# Patient Record
Sex: MALE | Race: BLACK OR AFRICAN AMERICAN
[De-identification: names, ages, dates, MRNs, and addresses within clinical notes are randomized per-mention and may not be internally consistent; named-entity substitution may affect disease eponyms.]

---

## 2021-04-14 ENCOUNTER — HOSPITAL ENCOUNTER (INPATIENT)
Dept: HOSPITAL 35 - ER | Age: 41
LOS: 5 days | DRG: 207 | End: 2021-04-19
Attending: HOSPITALIST | Admitting: HOSPITALIST
Payer: COMMERCIAL

## 2021-04-14 VITALS — SYSTOLIC BLOOD PRESSURE: 107 MMHG | DIASTOLIC BLOOD PRESSURE: 75 MMHG

## 2021-04-14 VITALS — DIASTOLIC BLOOD PRESSURE: 81 MMHG | SYSTOLIC BLOOD PRESSURE: 116 MMHG

## 2021-04-14 VITALS — SYSTOLIC BLOOD PRESSURE: 117 MMHG | DIASTOLIC BLOOD PRESSURE: 82 MMHG

## 2021-04-14 VITALS — SYSTOLIC BLOOD PRESSURE: 102 MMHG | DIASTOLIC BLOOD PRESSURE: 69 MMHG

## 2021-04-14 VITALS — SYSTOLIC BLOOD PRESSURE: 104 MMHG | DIASTOLIC BLOOD PRESSURE: 71 MMHG

## 2021-04-14 VITALS — DIASTOLIC BLOOD PRESSURE: 80 MMHG | SYSTOLIC BLOOD PRESSURE: 116 MMHG

## 2021-04-14 VITALS — SYSTOLIC BLOOD PRESSURE: 126 MMHG | DIASTOLIC BLOOD PRESSURE: 89 MMHG

## 2021-04-14 VITALS — SYSTOLIC BLOOD PRESSURE: 126 MMHG | DIASTOLIC BLOOD PRESSURE: 75 MMHG

## 2021-04-14 VITALS — SYSTOLIC BLOOD PRESSURE: 114 MMHG | DIASTOLIC BLOOD PRESSURE: 81 MMHG

## 2021-04-14 VITALS — SYSTOLIC BLOOD PRESSURE: 115 MMHG | DIASTOLIC BLOOD PRESSURE: 83 MMHG

## 2021-04-14 VITALS — SYSTOLIC BLOOD PRESSURE: 122 MMHG | DIASTOLIC BLOOD PRESSURE: 88 MMHG

## 2021-04-14 VITALS — DIASTOLIC BLOOD PRESSURE: 83 MMHG | SYSTOLIC BLOOD PRESSURE: 114 MMHG

## 2021-04-14 VITALS — HEIGHT: 72.01 IN | BODY MASS INDEX: 27.29 KG/M2 | WEIGHT: 201.5 LBS

## 2021-04-14 VITALS — SYSTOLIC BLOOD PRESSURE: 100 MMHG | DIASTOLIC BLOOD PRESSURE: 66 MMHG

## 2021-04-14 VITALS — DIASTOLIC BLOOD PRESSURE: 71 MMHG | SYSTOLIC BLOOD PRESSURE: 101 MMHG

## 2021-04-14 VITALS — SYSTOLIC BLOOD PRESSURE: 112 MMHG | DIASTOLIC BLOOD PRESSURE: 81 MMHG

## 2021-04-14 VITALS — DIASTOLIC BLOOD PRESSURE: 79 MMHG | SYSTOLIC BLOOD PRESSURE: 116 MMHG

## 2021-04-14 VITALS — DIASTOLIC BLOOD PRESSURE: 78 MMHG | SYSTOLIC BLOOD PRESSURE: 112 MMHG

## 2021-04-14 VITALS — SYSTOLIC BLOOD PRESSURE: 109 MMHG | DIASTOLIC BLOOD PRESSURE: 77 MMHG

## 2021-04-14 VITALS — SYSTOLIC BLOOD PRESSURE: 106 MMHG | DIASTOLIC BLOOD PRESSURE: 74 MMHG

## 2021-04-14 VITALS — SYSTOLIC BLOOD PRESSURE: 115 MMHG | DIASTOLIC BLOOD PRESSURE: 81 MMHG

## 2021-04-14 VITALS — DIASTOLIC BLOOD PRESSURE: 77 MMHG | SYSTOLIC BLOOD PRESSURE: 120 MMHG

## 2021-04-14 VITALS — DIASTOLIC BLOOD PRESSURE: 79 MMHG | SYSTOLIC BLOOD PRESSURE: 127 MMHG

## 2021-04-14 VITALS — DIASTOLIC BLOOD PRESSURE: 67 MMHG | SYSTOLIC BLOOD PRESSURE: 102 MMHG

## 2021-04-14 VITALS — SYSTOLIC BLOOD PRESSURE: 114 MMHG | DIASTOLIC BLOOD PRESSURE: 79 MMHG

## 2021-04-14 VITALS — SYSTOLIC BLOOD PRESSURE: 131 MMHG | DIASTOLIC BLOOD PRESSURE: 87 MMHG

## 2021-04-14 VITALS — DIASTOLIC BLOOD PRESSURE: 82 MMHG | SYSTOLIC BLOOD PRESSURE: 126 MMHG

## 2021-04-14 VITALS — SYSTOLIC BLOOD PRESSURE: 113 MMHG | DIASTOLIC BLOOD PRESSURE: 83 MMHG

## 2021-04-14 VITALS — SYSTOLIC BLOOD PRESSURE: 111 MMHG | DIASTOLIC BLOOD PRESSURE: 80 MMHG

## 2021-04-14 VITALS — DIASTOLIC BLOOD PRESSURE: 88 MMHG | SYSTOLIC BLOOD PRESSURE: 137 MMHG

## 2021-04-14 VITALS — DIASTOLIC BLOOD PRESSURE: 82 MMHG | SYSTOLIC BLOOD PRESSURE: 117 MMHG

## 2021-04-14 VITALS — DIASTOLIC BLOOD PRESSURE: 80 MMHG | SYSTOLIC BLOOD PRESSURE: 133 MMHG

## 2021-04-14 VITALS — DIASTOLIC BLOOD PRESSURE: 84 MMHG | SYSTOLIC BLOOD PRESSURE: 127 MMHG

## 2021-04-14 VITALS — DIASTOLIC BLOOD PRESSURE: 80 MMHG | SYSTOLIC BLOOD PRESSURE: 118 MMHG

## 2021-04-14 VITALS — SYSTOLIC BLOOD PRESSURE: 117 MMHG | DIASTOLIC BLOOD PRESSURE: 76 MMHG

## 2021-04-14 VITALS — DIASTOLIC BLOOD PRESSURE: 79 MMHG | SYSTOLIC BLOOD PRESSURE: 119 MMHG

## 2021-04-14 VITALS — SYSTOLIC BLOOD PRESSURE: 104 MMHG | DIASTOLIC BLOOD PRESSURE: 72 MMHG

## 2021-04-14 VITALS — DIASTOLIC BLOOD PRESSURE: 74 MMHG | SYSTOLIC BLOOD PRESSURE: 107 MMHG

## 2021-04-14 VITALS — DIASTOLIC BLOOD PRESSURE: 82 MMHG | SYSTOLIC BLOOD PRESSURE: 134 MMHG

## 2021-04-14 VITALS — SYSTOLIC BLOOD PRESSURE: 109 MMHG | DIASTOLIC BLOOD PRESSURE: 79 MMHG

## 2021-04-14 VITALS — DIASTOLIC BLOOD PRESSURE: 87 MMHG | SYSTOLIC BLOOD PRESSURE: 128 MMHG

## 2021-04-14 VITALS — SYSTOLIC BLOOD PRESSURE: 107 MMHG | DIASTOLIC BLOOD PRESSURE: 71 MMHG

## 2021-04-14 VITALS — DIASTOLIC BLOOD PRESSURE: 78 MMHG | SYSTOLIC BLOOD PRESSURE: 109 MMHG

## 2021-04-14 VITALS — DIASTOLIC BLOOD PRESSURE: 84 MMHG | SYSTOLIC BLOOD PRESSURE: 122 MMHG

## 2021-04-14 VITALS — SYSTOLIC BLOOD PRESSURE: 107 MMHG | DIASTOLIC BLOOD PRESSURE: 80 MMHG

## 2021-04-14 VITALS — SYSTOLIC BLOOD PRESSURE: 116 MMHG | DIASTOLIC BLOOD PRESSURE: 77 MMHG

## 2021-04-14 VITALS — SYSTOLIC BLOOD PRESSURE: 112 MMHG | DIASTOLIC BLOOD PRESSURE: 80 MMHG

## 2021-04-14 VITALS — SYSTOLIC BLOOD PRESSURE: 102 MMHG | DIASTOLIC BLOOD PRESSURE: 72 MMHG

## 2021-04-14 VITALS — SYSTOLIC BLOOD PRESSURE: 107 MMHG | DIASTOLIC BLOOD PRESSURE: 77 MMHG

## 2021-04-14 VITALS — DIASTOLIC BLOOD PRESSURE: 84 MMHG | SYSTOLIC BLOOD PRESSURE: 121 MMHG

## 2021-04-14 VITALS — DIASTOLIC BLOOD PRESSURE: 83 MMHG | SYSTOLIC BLOOD PRESSURE: 117 MMHG

## 2021-04-14 VITALS — DIASTOLIC BLOOD PRESSURE: 82 MMHG | SYSTOLIC BLOOD PRESSURE: 115 MMHG

## 2021-04-14 VITALS — DIASTOLIC BLOOD PRESSURE: 87 MMHG | SYSTOLIC BLOOD PRESSURE: 118 MMHG

## 2021-04-14 VITALS — DIASTOLIC BLOOD PRESSURE: 79 MMHG | SYSTOLIC BLOOD PRESSURE: 120 MMHG

## 2021-04-14 VITALS — SYSTOLIC BLOOD PRESSURE: 133 MMHG | DIASTOLIC BLOOD PRESSURE: 82 MMHG

## 2021-04-14 VITALS — DIASTOLIC BLOOD PRESSURE: 79 MMHG | SYSTOLIC BLOOD PRESSURE: 114 MMHG

## 2021-04-14 VITALS — DIASTOLIC BLOOD PRESSURE: 82 MMHG | SYSTOLIC BLOOD PRESSURE: 131 MMHG

## 2021-04-14 VITALS — SYSTOLIC BLOOD PRESSURE: 145 MMHG | DIASTOLIC BLOOD PRESSURE: 94 MMHG

## 2021-04-14 VITALS — SYSTOLIC BLOOD PRESSURE: 108 MMHG | DIASTOLIC BLOOD PRESSURE: 78 MMHG

## 2021-04-14 VITALS — SYSTOLIC BLOOD PRESSURE: 123 MMHG | DIASTOLIC BLOOD PRESSURE: 81 MMHG

## 2021-04-14 VITALS — DIASTOLIC BLOOD PRESSURE: 79 MMHG | SYSTOLIC BLOOD PRESSURE: 115 MMHG

## 2021-04-14 VITALS — DIASTOLIC BLOOD PRESSURE: 81 MMHG | SYSTOLIC BLOOD PRESSURE: 113 MMHG

## 2021-04-14 VITALS — SYSTOLIC BLOOD PRESSURE: 121 MMHG | DIASTOLIC BLOOD PRESSURE: 77 MMHG

## 2021-04-14 VITALS — SYSTOLIC BLOOD PRESSURE: 127 MMHG | DIASTOLIC BLOOD PRESSURE: 84 MMHG

## 2021-04-14 VITALS — DIASTOLIC BLOOD PRESSURE: 66 MMHG | SYSTOLIC BLOOD PRESSURE: 106 MMHG

## 2021-04-14 VITALS — DIASTOLIC BLOOD PRESSURE: 84 MMHG | SYSTOLIC BLOOD PRESSURE: 116 MMHG

## 2021-04-14 VITALS — DIASTOLIC BLOOD PRESSURE: 74 MMHG | SYSTOLIC BLOOD PRESSURE: 108 MMHG

## 2021-04-14 VITALS — SYSTOLIC BLOOD PRESSURE: 105 MMHG | DIASTOLIC BLOOD PRESSURE: 70 MMHG

## 2021-04-14 VITALS — DIASTOLIC BLOOD PRESSURE: 80 MMHG | SYSTOLIC BLOOD PRESSURE: 117 MMHG

## 2021-04-14 VITALS — SYSTOLIC BLOOD PRESSURE: 116 MMHG | DIASTOLIC BLOOD PRESSURE: 81 MMHG

## 2021-04-14 VITALS — DIASTOLIC BLOOD PRESSURE: 85 MMHG | SYSTOLIC BLOOD PRESSURE: 123 MMHG

## 2021-04-14 VITALS — DIASTOLIC BLOOD PRESSURE: 72 MMHG | SYSTOLIC BLOOD PRESSURE: 106 MMHG

## 2021-04-14 VITALS — DIASTOLIC BLOOD PRESSURE: 76 MMHG | SYSTOLIC BLOOD PRESSURE: 117 MMHG

## 2021-04-14 VITALS — DIASTOLIC BLOOD PRESSURE: 81 MMHG | SYSTOLIC BLOOD PRESSURE: 122 MMHG

## 2021-04-14 VITALS — SYSTOLIC BLOOD PRESSURE: 116 MMHG | DIASTOLIC BLOOD PRESSURE: 84 MMHG

## 2021-04-14 VITALS — DIASTOLIC BLOOD PRESSURE: 68 MMHG | SYSTOLIC BLOOD PRESSURE: 100 MMHG

## 2021-04-14 VITALS — DIASTOLIC BLOOD PRESSURE: 81 MMHG | SYSTOLIC BLOOD PRESSURE: 131 MMHG

## 2021-04-14 VITALS — DIASTOLIC BLOOD PRESSURE: 72 MMHG | SYSTOLIC BLOOD PRESSURE: 121 MMHG

## 2021-04-14 VITALS — SYSTOLIC BLOOD PRESSURE: 125 MMHG | DIASTOLIC BLOOD PRESSURE: 79 MMHG

## 2021-04-14 VITALS — DIASTOLIC BLOOD PRESSURE: 80 MMHG | SYSTOLIC BLOOD PRESSURE: 112 MMHG

## 2021-04-14 VITALS — SYSTOLIC BLOOD PRESSURE: 104 MMHG | DIASTOLIC BLOOD PRESSURE: 75 MMHG

## 2021-04-14 DIAGNOSIS — Z20.822: ICD-10-CM

## 2021-04-14 DIAGNOSIS — N18.9: ICD-10-CM

## 2021-04-14 DIAGNOSIS — N17.0: ICD-10-CM

## 2021-04-14 DIAGNOSIS — K72.00: ICD-10-CM

## 2021-04-14 DIAGNOSIS — Z79.899: ICD-10-CM

## 2021-04-14 DIAGNOSIS — M62.82: ICD-10-CM

## 2021-04-14 DIAGNOSIS — J96.01: Primary | ICD-10-CM

## 2021-04-14 DIAGNOSIS — F14.10: ICD-10-CM

## 2021-04-14 DIAGNOSIS — D64.9: ICD-10-CM

## 2021-04-14 DIAGNOSIS — F31.9: ICD-10-CM

## 2021-04-14 DIAGNOSIS — E87.2: ICD-10-CM

## 2021-04-14 DIAGNOSIS — F20.9: ICD-10-CM

## 2021-04-14 DIAGNOSIS — E87.6: ICD-10-CM

## 2021-04-14 DIAGNOSIS — I42.9: ICD-10-CM

## 2021-04-14 DIAGNOSIS — G93.1: ICD-10-CM

## 2021-04-14 DIAGNOSIS — R73.9: ICD-10-CM

## 2021-04-14 DIAGNOSIS — I46.9: ICD-10-CM

## 2021-04-14 DIAGNOSIS — E87.5: ICD-10-CM

## 2021-04-14 DIAGNOSIS — Z79.51: ICD-10-CM

## 2021-04-14 DIAGNOSIS — F12.10: ICD-10-CM

## 2021-04-14 DIAGNOSIS — E22.2: ICD-10-CM

## 2021-04-14 LAB
ALBUMIN SERPL-MCNC: 3.5 G/DL (ref 3.4–5)
ALT SERPL-CCNC: 199 U/L (ref 16–63)
ANION GAP SERPL CALC-SCNC: 10 MMOL/L (ref 7–16)
ANION GAP SERPL CALC-SCNC: 11 MMOL/L (ref 7–16)
ANION GAP SERPL CALC-SCNC: 15 MMOL/L (ref 7–16)
ANION GAP SERPL CALC-SCNC: 21 MMOL/L (ref 7–16)
APTT BLD: 21.9 SECONDS (ref 24.5–32.8)
APTT BLD: 26.1 SECONDS (ref 24.5–32.8)
APTT BLD: 28.8 SECONDS (ref 24.5–32.8)
AST SERPL-CCNC: 156 U/L (ref 15–37)
BASOPHILS NFR BLD AUTO: 0.1 % (ref 0–2)
BASOPHILS NFR BLD AUTO: 0.2 % (ref 0–2)
BASOPHILS NFR BLD AUTO: 0.4 % (ref 0–2)
BASOPHILS NFR BLD AUTO: 1 % (ref 0–2)
BE(VIVO): -12.8 MMOL/L
BE(VIVO): 2 MMOL/L
BILIRUB SERPL-MCNC: 0.2 MG/DL (ref 0.2–1)
BILIRUB UR-MCNC: NEGATIVE MG/DL
BUN SERPL-MCNC: 13 MG/DL (ref 7–18)
BUN SERPL-MCNC: 14 MG/DL (ref 7–18)
BUN SERPL-MCNC: 15 MG/DL (ref 7–18)
BUN SERPL-MCNC: 16 MG/DL (ref 7–18)
CALCIUM SERPL-MCNC: 8 MG/DL (ref 8.5–10.1)
CALCIUM SERPL-MCNC: 8.3 MG/DL (ref 8.5–10.1)
CHLORIDE SERPL-SCNC: 101 MMOL/L (ref 98–107)
CHLORIDE SERPL-SCNC: 103 MMOL/L (ref 98–107)
CHLORIDE SERPL-SCNC: 107 MMOL/L (ref 98–107)
CHLORIDE SERPL-SCNC: 108 MMOL/L (ref 98–107)
CO2 SERPL-SCNC: 22 MMOL/L (ref 21–32)
CO2 SERPL-SCNC: 24 MMOL/L (ref 21–32)
CO2 SERPL-SCNC: 24 MMOL/L (ref 21–32)
CO2 SERPL-SCNC: 25 MMOL/L (ref 21–32)
COCAINE UR-MCNC: POSITIVE NG/ML
COLOR UR: YELLOW
CREAT SERPL-MCNC: 1.1 MG/DL (ref 0.7–1.3)
CREAT SERPL-MCNC: 1.2 MG/DL (ref 0.7–1.3)
CREAT SERPL-MCNC: 1.6 MG/DL (ref 0.7–1.3)
CREAT SERPL-MCNC: 2 MG/DL (ref 0.7–1.3)
D DIMER PPP FEU-MCNC: 8.56 UG/MLFEU (ref 0.19–0.5)
EOSINOPHIL NFR BLD: 0 % (ref 0–3)
EOSINOPHIL NFR BLD: 0.1 % (ref 0–3)
EOSINOPHIL NFR BLD: 0.2 % (ref 0–3)
ERYTHROCYTE [DISTWIDTH] IN BLOOD BY AUTOMATED COUNT: 14.5 % (ref 10.5–14.5)
ERYTHROCYTE [DISTWIDTH] IN BLOOD BY AUTOMATED COUNT: 14.5 % (ref 10.5–14.5)
ERYTHROCYTE [DISTWIDTH] IN BLOOD BY AUTOMATED COUNT: 14.6 % (ref 10.5–14.5)
ERYTHROCYTE [DISTWIDTH] IN BLOOD BY AUTOMATED COUNT: 14.9 % (ref 10.5–14.5)
EST. AVERAGE GLUCOSE BLD GHB EST-MCNC: 108 MG/DL
FIBRINOGEN PPP-MCNC: 303 MG/DL (ref 210–360)
GLUCOSE SERPL-MCNC: 115 MG/DL (ref 74–106)
GLUCOSE SERPL-MCNC: 127 MG/DL (ref 74–106)
GLUCOSE SERPL-MCNC: 189 MG/DL (ref 74–106)
GLUCOSE SERPL-MCNC: 265 MG/DL (ref 74–106)
GLYCOHEMOGLOBIN (HGB A1C): 5.4 % (ref 4.8–5.6)
GRANULOCYTES NFR BLD MANUAL: 26 % (ref 36–66)
GRANULOCYTES NFR BLD MANUAL: 85 % (ref 36–66)
GRANULOCYTES NFR BLD MANUAL: 87.4 % (ref 36–66)
GRANULOCYTES NFR BLD MANUAL: 90.1 % (ref 36–66)
HCO3 BLD-SCNC: 16.6 MMOL/L (ref 22–26)
HCO3 BLD-SCNC: 24.6 MMOL/L (ref 22–26)
HCT VFR BLD CALC: 42.4 % (ref 42–52)
HCT VFR BLD CALC: 44 % (ref 42–52)
HCT VFR BLD CALC: 44.1 % (ref 42–52)
HCT VFR BLD CALC: 46.2 % (ref 42–52)
HGB BLD-MCNC: 14 GM/DL (ref 14–18)
HGB BLD-MCNC: 14.7 GM/DL (ref 14–18)
HGB BLD-MCNC: 14.8 GM/DL (ref 14–18)
HGB BLD-MCNC: 15.4 GM/DL (ref 14–18)
INR PPP: 1
INR PPP: 1.02
KETONES UR STRIP-MCNC: (no result) MG/DL
LYMPHOCYTES NFR BLD AUTO: 10.7 % (ref 24–44)
LYMPHOCYTES NFR BLD AUTO: 4.9 % (ref 24–44)
LYMPHOCYTES NFR BLD AUTO: 6.5 % (ref 24–44)
LYMPHOCYTES NFR BLD AUTO: 68 % (ref 24–44)
MAGNESIUM SERPL-MCNC: 1.8 MG/DL (ref 1.8–2.4)
MAGNESIUM SERPL-MCNC: 1.8 MG/DL (ref 1.8–2.4)
MAGNESIUM SERPL-MCNC: 2 MG/DL (ref 1.8–2.4)
MCH RBC QN AUTO: 30.6 PG (ref 26–34)
MCH RBC QN AUTO: 30.7 PG (ref 26–34)
MCH RBC QN AUTO: 30.9 PG (ref 26–34)
MCH RBC QN AUTO: 31.1 PG (ref 26–34)
MCHC RBC AUTO-ENTMCNC: 33 G/DL (ref 28–37)
MCHC RBC AUTO-ENTMCNC: 33.3 G/DL (ref 28–37)
MCHC RBC AUTO-ENTMCNC: 33.5 G/DL (ref 28–37)
MCHC RBC AUTO-ENTMCNC: 33.6 G/DL (ref 28–37)
MCV RBC: 91.3 FL (ref 80–100)
MCV RBC: 92 FL (ref 80–100)
MCV RBC: 92.3 FL (ref 80–100)
MCV RBC: 94.1 FL (ref 80–100)
MONOCYTES NFR BLD: 3.7 % (ref 1–8)
MONOCYTES NFR BLD: 4 % (ref 1–8)
MONOCYTES NFR BLD: 4.9 % (ref 1–8)
MONOCYTES NFR BLD: 5.8 % (ref 1–8)
MUCUS: (no result) STRN/LPF
NEUTROPHILS # BLD: 11 THOU/UL (ref 1.4–8.2)
NEUTROPHILS # BLD: 2.2 THOU/UL (ref 1.4–8.2)
NEUTROPHILS # BLD: 5.9 THOU/UL (ref 1.4–8.2)
NEUTROPHILS # BLD: 8.1 THOU/UL (ref 1.4–8.2)
NEUTS BAND NFR BLD: 1 % (ref 0–8)
PCO2 BLD: 32.7 MMHG (ref 35–45)
PCO2 BLD: 52 MMHG (ref 35–45)
PHOSPHATE SERPL-MCNC: 2.8 MG/DL (ref 2.6–4.7)
PHOSPHATE SERPL-MCNC: 2.9 MG/DL (ref 2.5–4.9)
PHOSPHATE SERPL-MCNC: 3.3 MG/DL (ref 2.5–4.9)
PLATELET # BLD EST: NORMAL 10*3/UL
PLATELET # BLD: 221 THOU/UL (ref 150–400)
PLATELET # BLD: 228 THOU/UL (ref 150–400)
PLATELET # BLD: 268 THOU/UL (ref 150–400)
PLATELET # BLD: 271 THOU/UL (ref 150–400)
PO2 BLD: 347 MMHG (ref 80–100)
PO2 BLD: 85.6 MMHG (ref 80–100)
POTASSIUM SERPL-SCNC: 2.8 MMOL/L (ref 3.5–5.1)
POTASSIUM SERPL-SCNC: 4 MMOL/L (ref 3.5–5.1)
POTASSIUM SERPL-SCNC: 4.8 MMOL/L (ref 3.5–5.1)
POTASSIUM SERPL-SCNC: 4.8 MMOL/L (ref 3.5–5.1)
PROT SERPL-MCNC: 7.3 G/DL (ref 6.4–8.2)
PROTHROMBIN TIME: 10.3 SECONDS (ref 9.3–11.4)
PROTHROMBIN TIME: 10.9 SECONDS (ref 9.3–11.4)
PROTHROMBIN TIME: 10.9 SECONDS (ref 9.3–11.4)
PROTHROMBIN TIME: 11.1 SECONDS (ref 9.3–11.4)
RBC # BLD AUTO: 4.5 MIL/UL (ref 4.5–6)
RBC # BLD AUTO: 4.79 MIL/UL (ref 4.5–6)
RBC # BLD AUTO: 4.81 MIL/UL (ref 4.5–6)
RBC # BLD AUTO: 5 MIL/UL (ref 4.5–6)
RBC # UR STRIP: (no result) /UL
RBC #/AREA URNS HPF: (no result) /HPF (ref 0–2)
RBC MORPH BLD: NORMAL
SODIUM SERPL-SCNC: 142 MMOL/L (ref 136–145)
SODIUM SERPL-SCNC: 142 MMOL/L (ref 136–145)
SODIUM SERPL-SCNC: 143 MMOL/L (ref 136–145)
SODIUM SERPL-SCNC: 144 MMOL/L (ref 136–145)
SP GR UR STRIP: 1.01 (ref 1–1.03)
SQUAMOUS: (no result) /LPF (ref 0–3)
TROPONIN I SERPL-MCNC: 0.85 NG/ML (ref ?–0.06)
TROPONIN I SERPL-MCNC: 3.01 NG/ML (ref ?–0.06)
TROPONIN I SERPL-MCNC: <0.06 NG/ML (ref ?–0.06)
URINE CLARITY: CLEAR
URINE GLUCOSE-RANDOM*: NEGATIVE
URINE LEUKOCYTES-REFLEX: NEGATIVE
URINE NITRITE-REFLEX: NEGATIVE
URINE PROTEIN (DIPSTICK): NEGATIVE
URINE WBC-REFLEX: (no result) /HPF (ref 0–5)
UROBILINOGEN UR STRIP-ACNC: 0.2 E.U./DL (ref 0.2–1)
WBC # BLD AUTO: 12.2 THOU/UL (ref 4–11)
WBC # BLD AUTO: 6.9 THOU/UL (ref 4–11)
WBC # BLD AUTO: 8.2 THOU/UL (ref 4–11)
WBC # BLD AUTO: 9.3 THOU/UL (ref 4–11)

## 2021-04-14 PROCEDURE — 10078: CPT

## 2021-04-14 NOTE — 2DMMODE
[facility]
[address]
[place]   [address]                   2 D/M-MODE ECHOCARDIOGRAM     
_______________________________________________________________________________
 
Name:       CAM CASTRO          Room #:         236-P       ADM IN  
M.R.#:      [id number]                       Account #:      [id number]  
Admission:  21    Attend Phys:    Surya Zamarripa MD    
Discharge:              Date of Birth:  [birth year]  
                                                          Report #: [id number]
                                                                    [id number]
_______________________________________________________________________________
THIS REPORT FOR:  
 
cc:  FAM - Family physician unknown
     FAM - Family physician unknown
     Dorian Suresh MD                                        
                                                                       ~
 
--------------- APPROVED REPORT --------------
 
 
Study performed:  2021 08:56:00
 
EXAM: Comprehensive 2D, Doppler, and color-flow 
Echocardiogram 
Patient Location: ICU    
Room #:  236     Status:  routine
 
       BSA:         1.90
HR: 47 bpm  BP:          116/80 mmHg 
Rhythm: NSR/joel     
 
Other Information 
Study Quality: Good/patient on vent
 
Indications
Status post cardiopulmonary arrest.
Polysubstance abuse.
 
2D Dimensions
RVDd:  43.76 mm   
IVSd:  9.84 (7-11mm)  LVOT Diam:  19.69 (18-24mm) 
LVDd:  41.79 mm   
PWd:  9.20 (7-11mm)  
LVDs:  36.19 (25-40mm)  
Left Atrium:  32.97 (27-40mm) 
Aortic Root:  28.95 mm  
 
Volumes
Left Atrial Volume (Systole) 
Single Plane 4CH:  36.66 mL Single Plane 2CH:  47.06 mL
    LA ESV Index:  24.00 mL/m2
 
Aortic Valve
AoV Peak Jaiden.:  0.76 m/s 
AO Peak Gr.:  2.33 mmHg  LVOT Max P.93 mmHg
    LVOT Max V:  0.69 m/s
 
 
[facility]
[address]
[place]  [address]
Phone:  [phone] 2 D/M-MODE ECHOCARDIOGRAM     
_______________________________________________________________________________
 
Name:            CAM CASTRO          Room #:        236-P       [facility] [place]
[facility]#:           [id number]          Account #:     [id number]  
Admission:       21         Attend Phys:   Surya Zamarripa MD
Discharge:                  Date of Birth: [birth year]  
                         Report #:      [id number]
        [id number]
_______________________________________________________________________________
GENARO Vmax: 2.77 cm2  
 
Mitral Valve
    E/A Ratio:  4.2
    MV Decel. Time:  199.56 ms
MV E Max Jaiden.:  0.59 m/s 
MV A Jaiden.:  0.14 m/s  
MV PHT:  57.87 ms  
IVRT:  143.02 ms  
 
Pulmonary Valve
PV Peak Jaiden.:  0.65 m/s PV Peak Gr.:  1.71 mmHg
 
Tricuspid Valve
TR Peak Jaiden.:  2.67 m/s  RAP Estimate:  10.00 mmHg
TR Peak Gr.:  29.00 mmHg 
    PA Pressure:  39.00 mmHg
 
Left Ventricle
The left ventricle is normal size. There is normal left ventricular 
wall thickness. Left ventricular systolic function is mildly 
decreased. LVEF is 45-50%. The left ventricular diastolic function is 
normal.
 
Right Ventricle
The right ventricle is normal size. The right ventricular systolic 
function is normal.
 
Atria
The left atrium size is normal. The right atrium size is 
normal.
 
Aortic Valve
The aortic valve is normal in structure. No aortic regurgitation is 
present. There is no aortic valvular stenosis.
 
Mitral Valve
The mitral valve is normal in structure. Trace mitral regurgitation. 
No evidence of mitral valve stenosis.
 
Tricuspid Valve
The tricuspid valve is normal in structure. Moderate tricuspid 
regurgitation. Estimated PAP 35-40mmHg.
 
Pulmonic Valve
The pulmonary valve is normal in structure. Trace pulmonic 
 
 
[facility]
[place] [employer] [employer]  [address]
Phone:  [phone]                    2 D/M-MODE ECHOCARDIOGRAM     
_______________________________________________________________________________
 
Name:            CAM CASTRO          Room #:        236-P       ADM IN
M.R.#:           [id number]          Account #:     [id number]  
Admission:       21         Attend Phys:   Surya Zamarripa MD
Discharge:                  Date of Birth: [birth year]  
                         Report #:      [id number]
        [id number]
_______________________________________________________________________________
regurgitation.
 
Great Vessels
The aortic root is normal in size. The ascending aorta is normal in 
size. IVC is normal in size and collapses <50% with 
inspiration.
 
Pericardium
There is no pericardial effusion.
 
<Conclusion>
The left ventricle is normal size.
Left ventricular systolic function is mildly decreased.
LVEF is 45-50%.
The right ventricle is normal size.
The right ventricular systolic function is normal.
The aortic valve is normal in structure.
The mitral valve is normal in structure.
Trace mitral regurgitation.
The tricuspid valve is normal in structure.
Moderate tricuspid regurgitation. Estimated PAP 35-40mmHg.
The pulmonary valve is normal in structure.
Trace pulmonic regurgitation.
The aortic root is normal in size.
There is no pericardial effusion.
 
 
 
 
 
 
 
 
 
 
 
 
 
 
 
 
 
 
 
  <ELECTRONICALLY SIGNED>
   By: Dorian Suresh MD    
  21     1005
D: 21 1005                           _____________________________________
T: 21 100                           Dorian Suresh MD      /INF

## 2021-04-14 NOTE — NUR
RN CAME ONTO THE UNIT, CXR REPORT AWAITING. RN CALLED RADIOLOGY, SPOKE WITH
CXR TECH KAMRAN. PER PRELIM REPORT ET/GTUBE BOTH IN GOOD POSITION. RN PROCEEDING
WITH USAGE.

## 2021-04-14 NOTE — NUR
chart review. outside hospital cardiac arrest, with friends with when went
down. in via ambulance to ed.  remains on vent. noted lives with his mother.
will cont following as needed for dc needs.

## 2021-04-14 NOTE — NUR
PT ARRIVED TO ICU FROM ER APPROX 0445, INTUBATED, ON PROPOFOL GTT AT 15
MCG/KG/MIN, EYES RHYTHMICALLY MOVING UP AND DOWN/BLINKING, ARMS AND LEGS
JERKING INTERMITTENTLY. TEMP 33.8 ON ARRIVAL VIA LUDWIG TEMP PROBE. PLACED ON
ARTIC SUN PER ORDERS FROM SHER SPEARS, SA 60-70'S, BP STABLE 120'S. NOTIFIED
RGEAN OF SEIZURE ACTIVITY, GAVE 2MG ATIVAN IV, JERKING HAS SLOWED. PENDING
LABS AT THIS TIME

## 2021-04-14 NOTE — NUR
VAT CONSULTED FOR CVAD. PT'S LABS,ORDER AND CONSENT VERIFIED. RIJ WAS WIDELY
PATENT WITH USG. 6FR TL JACC 25CM INSERTED TO 6CM EXTERNAL. PT TOLERATED WELL.
STAT CXR CONFIRMED RIJ AT [facility]. RELEASED FOR IMMEDIATE USE PER PROTOCOL

## 2021-04-14 NOTE — EKG
[place] [address]
[place]  [address]
Phone:  [phone]                    ELECTROCARDIOGRAM REPORT      
_______________________________________________________________________________
 
Name:       CAM CASTRO          Room #:         236-P       ADM IN  
M.R.#:      [id number]     Account #:      [id number]  
Admission:  21    Attend Phys:    Ghassan Mendoza MD     
Discharge:              Date of Birth:  [birth year]  
                                                          Report #: [id number]
   [id number]
_______________________________________________________________________________
                         [facility] ED
                                       
Test Date:    2021               Test Time:    01:42:54
Pat Name:     CAM CASTRO           Department:   
Patient ID:   [id number]            Room:         236
Gender:       M                        Technician:   MONY
:          1980               Requested By: Tyesha Antunez
Order Number: [id number] MD:   Justus Wilcox
                                 Measurements
Intervals                              Axis          
Rate:         106                      P:            0
TN:           262                      QRS:          33
QRSD:         100                      T:            5
QT:           271                                    
QTc:          360                                    
                           Interpretive Statements
Sinus tachycardia
Prolonged TN interval
Right atrial enlargement
RSR' in V1 or V2, probably normal variant
Probable left ventricular hypertrophy
Borderline T abnormalities, inferior leads
No previous ECG available for comparison
Electronically Signed On 2021 7:04:15 CDT by Justus Wilcox
[email or web address]
 
 
 
 
 
 
 
 
 
 
 
 
 
 
 
 
 
 
  <ELECTRONICALLY SIGNED>
   By: Justus Wilcox MD, [facility]    
  21     0704
D: 21 014                           _____________________________________
T: 21 014                           Justus Wilcox MD, FACC      /EPI

## 2021-04-14 NOTE — HC
[facility]
[address]
[place]   [address]                     CONSULTATION                  
_______________________________________________________________________________
 
Name:       CAM CASTRO JR         Room #:         236-P       ADM IN  
M.R.#:      [id number]                       Account #:      [id number]  
Admission:  04/14/21    Attend Phys:    Surya Zamarripa MD    
Discharge:              Date of Birth:  [birth year]  
                                                          Report #: [id number]
                                                                    [id number]   
_______________________________________________________________________________
THIS REPORT FOR:  
 
cc:  FAM - Family physician unknown
     FAM - Family physician unknown                                       
     Juaquin Coburn MD                                         ~
 
DATE OF SERVICE:  04/14/2021
 
 
HISTORY OF PRESENT ILLNESS:  This 40-year-old male patient who was seen by me
for hypoxic encephalopathy.  This patient has a history of polysubstance abuse. 
He has a cardiac arrest with significant amount of downtime.  He is having some
unusual movement of the eyes and body, which is difficult to tell if it was
posturing or any seizure activity.  The patient is on benzodiazepine and he is
also on propofol.  In spite of that, he is having those activity.  He is also on
Keppra.
 
REVIEW OF SYSTEMS:  From the record, it looks like the patient has a history of
polysubstance abuse.  He smokes as well as drink alcohol.  He had what looks
like a cardiac arrest with prolonged downtime.  Now, he is on hypothermia
protocol.  This was his relevant 14-point review of system, which is available.
 
PAST MEDICAL HISTORY:  Unavailable in this patient, but looks like he has
polysubstance abuse for a long period of time.
 
SOCIAL HISTORY:  He smokes or drinks and use drugs.
 
FAMILY HISTORY:  Unavailable.
 
PHYSICAL EXAMINATION:  Pretty limited.  He is unresponsive to pain and verbal
stimuli.  Cranial nerve examination was attempted.  His pupils are pinpoint and
does not appear to be reactive.  He has no plantar and in fact has no response
or reflexes.  He is intubated.  He is on a vent.  His blood pressure is
maintained at 114/81, the white count is 9.3.  He did have a CT scan of the head
on admission and that appeared to be showing some early edema even at early
stage.  His respirations 16, pulse is 48, temperature is 98.5 because he is on
hypothermia protocol.
 
LABORATORY DATA:  His WBC count is normal.  GFR is normal.
 
IMPRESSION:  This patient appeared to have hypoxic encephalopathy.  He will need
a repeat CT in 24-48 hours to see how his edema looks.  We will get an EEG even
if he is on hypothermia protocol to see if any seizure activity is going on.  If
no seizure activity is there we will consider posturing and if seizure activity
is there, then we will treat more aggressively.  Nurses tell me it is difficult
to increase his propofol because his vital sign becomes altered.  We may have to
 
 
 
[place] [address]                     CONSULTATION                  
_______________________________________________________________________________
 
Name:       CAM CASTRO FELIPE          Room #:         236-P       [facility] [place]  
M.R.#:      [id number]                       Account #:      [id number]  
Admission:  04/14/21    Attend Phys:    Surya Zamarripa MD    
Discharge:              Date of Birth:  [birth year]  
                                                          Report #: [id number]
                                                                    [id number]   
_______________________________________________________________________________
 
put him on Depakote.  We will await the EEG.
 
Thank you very much for this referral.
 
 
 
 
 
 
 
 
 
 
 
 
 
 
 
 
 
 
 
 
 
 
 
 
 
 
 
 
 
 
 
 
 
 
 
 
 
 
 
 
                         
   By:                               
                   
D: 04/14/21 1226                           _____________________________________
T: 04/14/21 1941                           Juaquin Coburn MD           /nt

## 2021-04-14 NOTE — NUR
IV #4-#18 RIGHT FOREARM, IV#5-#20 RIGHT WRIST.  ALL PLACED WITH 1 STICK, TRANS-
PARENT DRESSINGS WITH PUMPS IN PLACE.

## 2021-04-14 NOTE — EEG
[facility]
[address]
[place]  [address]                      ELECTROENCEPHALOGRAM          
_______________________________________________________________________________
 
Name:       CAM CASTRO           Room #:         236-P       ADM IN  
M.R.#:      [id number]      Account #:      [id number]  
Admission:  04/14/21     Attend Phys:    Surya Zamarripa MD   
Discharge:               Date of Birth:  [birth year]  
                                                           Report #: [id number]
    [id number]  
_______________________________________________________________________________
THIS REPORT FOR:   //name//                          
 
DATE OF SERVICE:  04/14/2021
 
 
This patient is being evaluated for the possibility of seizures after cardiac
arrest.  EEG was done when the patient is on sedation.  EEG was done by placing
the electrode by standard 10-20 system of electrode placement.  Both referential
and sequential montages were used for recording.  Background activity is
difficult to determine because there is low voltage and barely perceptible but
the patient is on sedation.  Intermittently, the patient continued to have
epileptiform activities.  Some suggestion of burst suppression is present, but
is not typical for burst suppression.
 
IMPRESSION:  This is an abnormal EEG because of very low voltage activity is
present and epileptiform activity also appeared to be present.  Clinical
correlation is recommended.
 
 
 
 
 
 
 
 
 
 
 
 
 
 
 
 
 
 
 
 
 
 
 
 
 
 
 
                              
   By:                               
                   
D: 04/15/21 1549                           _____________________________________
T: 04/15/21 1600                           Juaquin Coburn MD           /nt

## 2021-04-14 NOTE — EKG
[address] [facility]
[place]  [address]
Phone:  [phone]                    ELECTROCARDIOGRAM REPORT      
_______________________________________________________________________________
 
Name:       CAM CASTRO          Room #:         236-P       ADM IN  
M.R.#:      [id number]     Account #:      [id number]  
Admission:  21    Attend Phys:    Surya Zamarripa MD    
Discharge:              Date of Birth:  [birth year]  
                                                          Report #: [id number]
   [id number]
_______________________________________________________________________________
                          [facility] [place]
                                       
Test Date:    2021               Test Time:    08:37:56
Pat Name:     CAM CASTRO           Department:   
Patient ID:   [id number]            Room:         236 P
Gender:       M                        Technician:   JORDAN
:          1980               Requested By: Merissa Carpenter
Order Number: [id number] MD:   Justus Wilcox
                                 Measurements
Intervals                              Axis          
Rate:         48                       P:            51
CO:           168                      QRS:          59
QRSD:         125                      T:            34
QT:           570                                    
QTc:          510                                    
                           Interpretive Statements
Sinus bradycardia
Nonspecific intraventricular conduction delay
ST elev, probable normal early repol pattern
Baseline wander in lead(s) V6
Compared to ECG 2021 01:42:54
Intraventricular conduction delay now present
ST (T wave) deviation now present
Sinus tachycardia no longer present
First degree AV block no longer present
Atrial abnormality no longer present
T-wave abnormality no longer present
Electronically Signed On 2021 9:23:37 CDT by Justus Wilcox
[email or web address]
 
 
 
 
 
 
 
 
 
 
 
 
 
 
  <ELECTRONICALLY SIGNED>
   By: Justus Wilcox MD, [facility]    
  21     0923
D: 21                           _____________________________________
T: 21                           Justus Wilcox MD, [facility]      /EPI

## 2021-04-15 VITALS — DIASTOLIC BLOOD PRESSURE: 70 MMHG | SYSTOLIC BLOOD PRESSURE: 119 MMHG

## 2021-04-15 VITALS — DIASTOLIC BLOOD PRESSURE: 70 MMHG | SYSTOLIC BLOOD PRESSURE: 122 MMHG

## 2021-04-15 VITALS — SYSTOLIC BLOOD PRESSURE: 123 MMHG | DIASTOLIC BLOOD PRESSURE: 69 MMHG

## 2021-04-15 VITALS — DIASTOLIC BLOOD PRESSURE: 69 MMHG | SYSTOLIC BLOOD PRESSURE: 118 MMHG

## 2021-04-15 VITALS — DIASTOLIC BLOOD PRESSURE: 69 MMHG | SYSTOLIC BLOOD PRESSURE: 130 MMHG

## 2021-04-15 VITALS — SYSTOLIC BLOOD PRESSURE: 133 MMHG | DIASTOLIC BLOOD PRESSURE: 85 MMHG

## 2021-04-15 VITALS — DIASTOLIC BLOOD PRESSURE: 69 MMHG | SYSTOLIC BLOOD PRESSURE: 119 MMHG

## 2021-04-15 VITALS — SYSTOLIC BLOOD PRESSURE: 122 MMHG | DIASTOLIC BLOOD PRESSURE: 65 MMHG

## 2021-04-15 VITALS — DIASTOLIC BLOOD PRESSURE: 66 MMHG | SYSTOLIC BLOOD PRESSURE: 123 MMHG

## 2021-04-15 VITALS — SYSTOLIC BLOOD PRESSURE: 106 MMHG | DIASTOLIC BLOOD PRESSURE: 55 MMHG

## 2021-04-15 VITALS — DIASTOLIC BLOOD PRESSURE: 86 MMHG | SYSTOLIC BLOOD PRESSURE: 130 MMHG

## 2021-04-15 VITALS — SYSTOLIC BLOOD PRESSURE: 114 MMHG | DIASTOLIC BLOOD PRESSURE: 59 MMHG

## 2021-04-15 VITALS — DIASTOLIC BLOOD PRESSURE: 73 MMHG | SYSTOLIC BLOOD PRESSURE: 129 MMHG

## 2021-04-15 VITALS — SYSTOLIC BLOOD PRESSURE: 126 MMHG | DIASTOLIC BLOOD PRESSURE: 76 MMHG

## 2021-04-15 VITALS — DIASTOLIC BLOOD PRESSURE: 86 MMHG | SYSTOLIC BLOOD PRESSURE: 127 MMHG

## 2021-04-15 VITALS — SYSTOLIC BLOOD PRESSURE: 120 MMHG | DIASTOLIC BLOOD PRESSURE: 78 MMHG

## 2021-04-15 VITALS — DIASTOLIC BLOOD PRESSURE: 64 MMHG | SYSTOLIC BLOOD PRESSURE: 120 MMHG

## 2021-04-15 VITALS — SYSTOLIC BLOOD PRESSURE: 119 MMHG | DIASTOLIC BLOOD PRESSURE: 71 MMHG

## 2021-04-15 VITALS — SYSTOLIC BLOOD PRESSURE: 121 MMHG | DIASTOLIC BLOOD PRESSURE: 66 MMHG

## 2021-04-15 VITALS — DIASTOLIC BLOOD PRESSURE: 75 MMHG | SYSTOLIC BLOOD PRESSURE: 118 MMHG

## 2021-04-15 VITALS — DIASTOLIC BLOOD PRESSURE: 69 MMHG | SYSTOLIC BLOOD PRESSURE: 122 MMHG

## 2021-04-15 VITALS — SYSTOLIC BLOOD PRESSURE: 126 MMHG | DIASTOLIC BLOOD PRESSURE: 75 MMHG

## 2021-04-15 VITALS — SYSTOLIC BLOOD PRESSURE: 133 MMHG | DIASTOLIC BLOOD PRESSURE: 89 MMHG

## 2021-04-15 VITALS — DIASTOLIC BLOOD PRESSURE: 70 MMHG | SYSTOLIC BLOOD PRESSURE: 125 MMHG

## 2021-04-15 VITALS — SYSTOLIC BLOOD PRESSURE: 125 MMHG | DIASTOLIC BLOOD PRESSURE: 73 MMHG

## 2021-04-15 VITALS — DIASTOLIC BLOOD PRESSURE: 78 MMHG | SYSTOLIC BLOOD PRESSURE: 118 MMHG

## 2021-04-15 VITALS — DIASTOLIC BLOOD PRESSURE: 54 MMHG | SYSTOLIC BLOOD PRESSURE: 105 MMHG

## 2021-04-15 VITALS — SYSTOLIC BLOOD PRESSURE: 114 MMHG | DIASTOLIC BLOOD PRESSURE: 57 MMHG

## 2021-04-15 VITALS — DIASTOLIC BLOOD PRESSURE: 67 MMHG | SYSTOLIC BLOOD PRESSURE: 123 MMHG

## 2021-04-15 VITALS — SYSTOLIC BLOOD PRESSURE: 117 MMHG | DIASTOLIC BLOOD PRESSURE: 82 MMHG

## 2021-04-15 VITALS — SYSTOLIC BLOOD PRESSURE: 123 MMHG | DIASTOLIC BLOOD PRESSURE: 67 MMHG

## 2021-04-15 VITALS — DIASTOLIC BLOOD PRESSURE: 69 MMHG | SYSTOLIC BLOOD PRESSURE: 120 MMHG

## 2021-04-15 VITALS — DIASTOLIC BLOOD PRESSURE: 87 MMHG | SYSTOLIC BLOOD PRESSURE: 130 MMHG

## 2021-04-15 VITALS — SYSTOLIC BLOOD PRESSURE: 126 MMHG | DIASTOLIC BLOOD PRESSURE: 71 MMHG

## 2021-04-15 VITALS — DIASTOLIC BLOOD PRESSURE: 70 MMHG | SYSTOLIC BLOOD PRESSURE: 118 MMHG

## 2021-04-15 VITALS — DIASTOLIC BLOOD PRESSURE: 68 MMHG | SYSTOLIC BLOOD PRESSURE: 121 MMHG

## 2021-04-15 VITALS — SYSTOLIC BLOOD PRESSURE: 132 MMHG | DIASTOLIC BLOOD PRESSURE: 72 MMHG

## 2021-04-15 VITALS — SYSTOLIC BLOOD PRESSURE: 122 MMHG | DIASTOLIC BLOOD PRESSURE: 84 MMHG

## 2021-04-15 VITALS — DIASTOLIC BLOOD PRESSURE: 75 MMHG | SYSTOLIC BLOOD PRESSURE: 119 MMHG

## 2021-04-15 VITALS — SYSTOLIC BLOOD PRESSURE: 117 MMHG | DIASTOLIC BLOOD PRESSURE: 73 MMHG

## 2021-04-15 VITALS — SYSTOLIC BLOOD PRESSURE: 116 MMHG | DIASTOLIC BLOOD PRESSURE: 70 MMHG

## 2021-04-15 VITALS — DIASTOLIC BLOOD PRESSURE: 58 MMHG | SYSTOLIC BLOOD PRESSURE: 108 MMHG

## 2021-04-15 VITALS — DIASTOLIC BLOOD PRESSURE: 69 MMHG | SYSTOLIC BLOOD PRESSURE: 116 MMHG

## 2021-04-15 VITALS — SYSTOLIC BLOOD PRESSURE: 114 MMHG | DIASTOLIC BLOOD PRESSURE: 73 MMHG

## 2021-04-15 VITALS — SYSTOLIC BLOOD PRESSURE: 126 MMHG | DIASTOLIC BLOOD PRESSURE: 88 MMHG

## 2021-04-15 VITALS — SYSTOLIC BLOOD PRESSURE: 117 MMHG | DIASTOLIC BLOOD PRESSURE: 71 MMHG

## 2021-04-15 VITALS — DIASTOLIC BLOOD PRESSURE: 84 MMHG | SYSTOLIC BLOOD PRESSURE: 124 MMHG

## 2021-04-15 VITALS — DIASTOLIC BLOOD PRESSURE: 71 MMHG | SYSTOLIC BLOOD PRESSURE: 119 MMHG

## 2021-04-15 VITALS — DIASTOLIC BLOOD PRESSURE: 59 MMHG | SYSTOLIC BLOOD PRESSURE: 112 MMHG

## 2021-04-15 VITALS — SYSTOLIC BLOOD PRESSURE: 129 MMHG | DIASTOLIC BLOOD PRESSURE: 67 MMHG

## 2021-04-15 VITALS — SYSTOLIC BLOOD PRESSURE: 125 MMHG | DIASTOLIC BLOOD PRESSURE: 66 MMHG

## 2021-04-15 VITALS — SYSTOLIC BLOOD PRESSURE: 121 MMHG | DIASTOLIC BLOOD PRESSURE: 62 MMHG

## 2021-04-15 VITALS — SYSTOLIC BLOOD PRESSURE: 131 MMHG | DIASTOLIC BLOOD PRESSURE: 86 MMHG

## 2021-04-15 VITALS — SYSTOLIC BLOOD PRESSURE: 131 MMHG | DIASTOLIC BLOOD PRESSURE: 69 MMHG

## 2021-04-15 VITALS — DIASTOLIC BLOOD PRESSURE: 64 MMHG | SYSTOLIC BLOOD PRESSURE: 118 MMHG

## 2021-04-15 VITALS — SYSTOLIC BLOOD PRESSURE: 124 MMHG | DIASTOLIC BLOOD PRESSURE: 71 MMHG

## 2021-04-15 VITALS — DIASTOLIC BLOOD PRESSURE: 80 MMHG | SYSTOLIC BLOOD PRESSURE: 124 MMHG

## 2021-04-15 VITALS — SYSTOLIC BLOOD PRESSURE: 119 MMHG | DIASTOLIC BLOOD PRESSURE: 65 MMHG

## 2021-04-15 VITALS — DIASTOLIC BLOOD PRESSURE: 68 MMHG | SYSTOLIC BLOOD PRESSURE: 127 MMHG

## 2021-04-15 VITALS — SYSTOLIC BLOOD PRESSURE: 118 MMHG | DIASTOLIC BLOOD PRESSURE: 70 MMHG

## 2021-04-15 VITALS — DIASTOLIC BLOOD PRESSURE: 83 MMHG | SYSTOLIC BLOOD PRESSURE: 129 MMHG

## 2021-04-15 VITALS — SYSTOLIC BLOOD PRESSURE: 122 MMHG | DIASTOLIC BLOOD PRESSURE: 75 MMHG

## 2021-04-15 VITALS — DIASTOLIC BLOOD PRESSURE: 71 MMHG | SYSTOLIC BLOOD PRESSURE: 112 MMHG

## 2021-04-15 VITALS — DIASTOLIC BLOOD PRESSURE: 87 MMHG | SYSTOLIC BLOOD PRESSURE: 133 MMHG

## 2021-04-15 VITALS — SYSTOLIC BLOOD PRESSURE: 120 MMHG | DIASTOLIC BLOOD PRESSURE: 70 MMHG

## 2021-04-15 VITALS — SYSTOLIC BLOOD PRESSURE: 127 MMHG | DIASTOLIC BLOOD PRESSURE: 76 MMHG

## 2021-04-15 VITALS — SYSTOLIC BLOOD PRESSURE: 121 MMHG | DIASTOLIC BLOOD PRESSURE: 61 MMHG

## 2021-04-15 VITALS — DIASTOLIC BLOOD PRESSURE: 67 MMHG | SYSTOLIC BLOOD PRESSURE: 121 MMHG

## 2021-04-15 VITALS — SYSTOLIC BLOOD PRESSURE: 137 MMHG | DIASTOLIC BLOOD PRESSURE: 70 MMHG

## 2021-04-15 VITALS — DIASTOLIC BLOOD PRESSURE: 68 MMHG | SYSTOLIC BLOOD PRESSURE: 114 MMHG

## 2021-04-15 VITALS — DIASTOLIC BLOOD PRESSURE: 78 MMHG | SYSTOLIC BLOOD PRESSURE: 119 MMHG

## 2021-04-15 VITALS — DIASTOLIC BLOOD PRESSURE: 69 MMHG | SYSTOLIC BLOOD PRESSURE: 134 MMHG

## 2021-04-15 VITALS — SYSTOLIC BLOOD PRESSURE: 117 MMHG | DIASTOLIC BLOOD PRESSURE: 75 MMHG

## 2021-04-15 VITALS — DIASTOLIC BLOOD PRESSURE: 68 MMHG | SYSTOLIC BLOOD PRESSURE: 118 MMHG

## 2021-04-15 VITALS — SYSTOLIC BLOOD PRESSURE: 128 MMHG | DIASTOLIC BLOOD PRESSURE: 82 MMHG

## 2021-04-15 VITALS — DIASTOLIC BLOOD PRESSURE: 87 MMHG | SYSTOLIC BLOOD PRESSURE: 131 MMHG

## 2021-04-15 VITALS — DIASTOLIC BLOOD PRESSURE: 73 MMHG | SYSTOLIC BLOOD PRESSURE: 109 MMHG

## 2021-04-15 VITALS — SYSTOLIC BLOOD PRESSURE: 133 MMHG | DIASTOLIC BLOOD PRESSURE: 86 MMHG

## 2021-04-15 VITALS — DIASTOLIC BLOOD PRESSURE: 58 MMHG | SYSTOLIC BLOOD PRESSURE: 114 MMHG

## 2021-04-15 VITALS — DIASTOLIC BLOOD PRESSURE: 63 MMHG | SYSTOLIC BLOOD PRESSURE: 120 MMHG

## 2021-04-15 VITALS — SYSTOLIC BLOOD PRESSURE: 114 MMHG | DIASTOLIC BLOOD PRESSURE: 60 MMHG

## 2021-04-15 VITALS — SYSTOLIC BLOOD PRESSURE: 124 MMHG | DIASTOLIC BLOOD PRESSURE: 66 MMHG

## 2021-04-15 VITALS — SYSTOLIC BLOOD PRESSURE: 121 MMHG | DIASTOLIC BLOOD PRESSURE: 67 MMHG

## 2021-04-15 VITALS — DIASTOLIC BLOOD PRESSURE: 69 MMHG | SYSTOLIC BLOOD PRESSURE: 126 MMHG

## 2021-04-15 VITALS — DIASTOLIC BLOOD PRESSURE: 62 MMHG | SYSTOLIC BLOOD PRESSURE: 118 MMHG

## 2021-04-15 VITALS — SYSTOLIC BLOOD PRESSURE: 113 MMHG | DIASTOLIC BLOOD PRESSURE: 75 MMHG

## 2021-04-15 VITALS — SYSTOLIC BLOOD PRESSURE: 137 MMHG | DIASTOLIC BLOOD PRESSURE: 68 MMHG

## 2021-04-15 VITALS — DIASTOLIC BLOOD PRESSURE: 67 MMHG | SYSTOLIC BLOOD PRESSURE: 129 MMHG

## 2021-04-15 VITALS — SYSTOLIC BLOOD PRESSURE: 119 MMHG | DIASTOLIC BLOOD PRESSURE: 78 MMHG

## 2021-04-15 LAB
ANION GAP SERPL CALC-SCNC: 11 MMOL/L (ref 7–16)
ANION GAP SERPL CALC-SCNC: 12 MMOL/L (ref 7–16)
ANION GAP SERPL CALC-SCNC: 15 MMOL/L (ref 7–16)
APTT BLD: 32.8 SECONDS (ref 24.5–32.8)
APTT BLD: 33.2 SECONDS (ref 24.5–32.8)
BASOPHILS NFR BLD AUTO: 0.2 % (ref 0–2)
BASOPHILS NFR BLD AUTO: 0.3 % (ref 0–2)
BE(VIVO): -2.5 MMOL/L
BUN SERPL-MCNC: 11 MG/DL (ref 7–18)
BUN SERPL-MCNC: 12 MG/DL (ref 7–18)
BUN SERPL-MCNC: 13 MG/DL (ref 7–18)
CALCIUM SERPL-MCNC: 7.3 MG/DL (ref 8.5–10.1)
CALCIUM SERPL-MCNC: 7.7 MG/DL (ref 8.5–10.1)
CALCIUM SERPL-MCNC: 7.8 MG/DL (ref 8.5–10.1)
CHLORIDE SERPL-SCNC: 109 MMOL/L (ref 98–107)
CHLORIDE SERPL-SCNC: 109 MMOL/L (ref 98–107)
CHLORIDE SERPL-SCNC: 110 MMOL/L (ref 98–107)
CO2 SERPL-SCNC: 21 MMOL/L (ref 21–32)
CO2 SERPL-SCNC: 23 MMOL/L (ref 21–32)
CO2 SERPL-SCNC: 24 MMOL/L (ref 21–32)
CREAT SERPL-MCNC: 1 MG/DL (ref 0.7–1.3)
CREAT SERPL-MCNC: 1 MG/DL (ref 0.7–1.3)
CREAT SERPL-MCNC: 1.6 MG/DL (ref 0.7–1.3)
EOSINOPHIL NFR BLD: 0 % (ref 0–3)
EOSINOPHIL NFR BLD: 0 % (ref 0–3)
ERYTHROCYTE [DISTWIDTH] IN BLOOD BY AUTOMATED COUNT: 14.3 % (ref 10.5–14.5)
ERYTHROCYTE [DISTWIDTH] IN BLOOD BY AUTOMATED COUNT: 14.6 % (ref 10.5–14.5)
GLUCOSE SERPL-MCNC: 114 MG/DL (ref 74–106)
GLUCOSE SERPL-MCNC: 133 MG/DL (ref 74–106)
GLUCOSE SERPL-MCNC: 186 MG/DL (ref 74–106)
GRANULOCYTES NFR BLD MANUAL: 88.6 % (ref 36–66)
GRANULOCYTES NFR BLD MANUAL: 91.4 % (ref 36–66)
HCO3 BLD-SCNC: 20.4 MMOL/L (ref 22–26)
HCT VFR BLD CALC: 41 % (ref 42–52)
HCT VFR BLD CALC: 42.3 % (ref 42–52)
HGB BLD-MCNC: 13.9 GM/DL (ref 14–18)
HGB BLD-MCNC: 14.2 GM/DL (ref 14–18)
INR PPP: 1
INR PPP: 1.01
LYMPHOCYTES NFR BLD AUTO: 4.6 % (ref 24–44)
LYMPHOCYTES NFR BLD AUTO: 4.6 % (ref 24–44)
MAGNESIUM SERPL-MCNC: 1.8 MG/DL (ref 1.8–2.4)
MAGNESIUM SERPL-MCNC: 1.9 MG/DL (ref 1.8–2.4)
MCH RBC QN AUTO: 30.2 PG (ref 26–34)
MCH RBC QN AUTO: 31.4 PG (ref 26–34)
MCHC RBC AUTO-ENTMCNC: 33 G/DL (ref 28–37)
MCHC RBC AUTO-ENTMCNC: 34.6 G/DL (ref 28–37)
MCV RBC: 90.8 FL (ref 80–100)
MCV RBC: 91.6 FL (ref 80–100)
MONOCYTES NFR BLD: 3.7 % (ref 1–8)
MONOCYTES NFR BLD: 6.6 % (ref 1–8)
NEUTROPHILS # BLD: 12.8 THOU/UL (ref 1.4–8.2)
NEUTROPHILS # BLD: 13.9 THOU/UL (ref 1.4–8.2)
PCO2 BLD: 30.4 MMHG (ref 35–45)
PHOSPHATE SERPL-MCNC: 3.5 MG/DL (ref 2.6–4.7)
PHOSPHATE SERPL-MCNC: 3.6 MG/DL (ref 2.5–4.9)
PLATELET # BLD: 210 THOU/UL (ref 150–400)
PLATELET # BLD: 225 THOU/UL (ref 150–400)
PO2 BLD: 77.5 MMHG (ref 80–100)
POTASSIUM SERPL-SCNC: 3.2 MMOL/L (ref 3.5–5.1)
POTASSIUM SERPL-SCNC: 4 MMOL/L (ref 3.5–5.1)
POTASSIUM SERPL-SCNC: 4.1 MMOL/L (ref 3.5–5.1)
PROTHROMBIN TIME: 10.9 SECONDS (ref 9.3–11.4)
PROTHROMBIN TIME: 11 SECONDS (ref 9.3–11.4)
RBC # BLD AUTO: 4.52 MIL/UL (ref 4.5–6)
RBC # BLD AUTO: 4.61 MIL/UL (ref 4.5–6)
SODIUM SERPL-SCNC: 144 MMOL/L (ref 136–145)
SODIUM SERPL-SCNC: 145 MMOL/L (ref 136–145)
SODIUM SERPL-SCNC: 145 MMOL/L (ref 136–145)
TROPONIN I SERPL-MCNC: 0.79 NG/ML (ref ?–0.06)
WBC # BLD AUTO: 14 THOU/UL (ref 4–11)
WBC # BLD AUTO: 15.7 THOU/UL (ref 4–11)

## 2021-04-15 NOTE — NUR
FROM RR 1445. PT IMMED C/O PAIN FROM LUDWIG CATHETER.WARM BLANKET ACROSS LOWER
ABD.STATES OP PAIN IS VERY LITTLE.BPS LOW 80'S ON ARRIVAL. HOSSEIN (CV PA) IN &
250ML NS FLD BOLUS GIVEN. BPS NOW >100mmHG. WIFE AT BEDSIDE.MUCH REASSURANCE
GIVEN TO PT & WIFE. LIGHT WT ICEPACK TO RT NECK.--VW

## 2021-04-15 NOTE — EKG
[place] [address]
[place]  [address]
Phone:  [phone]                    ELECTROCARDIOGRAM REPORT      
_______________________________________________________________________________
 
Name:       CAM CASTRO          Room #:         236-P       ADM IN  
M.R.#:      [id number]     Account #:      [id number]  
Admission:  21    Attend Phys:    Surya Zamarripa MD    
Discharge:              Date of Birth:  [birth year]  
                                                          Report #: [id number]
   [id number]
_______________________________________________________________________________
                          [facility]
                                       
Test Date:    2021-04-15               Test Time:    07:41:38
Pat Name:     CAM CASTRO           Department:   
Patient ID:   [id number]            Room:         236 P
Gender:       M                        Technician:   JORDAN
:          1980               Requested By: Merissa Carpenter
Order Number: [id number] MD:   Quinn Lam
                                 Measurements
Intervals                              Axis          
Rate:         65                       P:            63
DE:           140                      QRS:          56
QRSD:         99                       T:            15
QT:           493                                    
QTc:          513                                    
                           Interpretive Statements
Sinus rhythm
RSR' in V1 or V2, probably normal variant
ST elev, probable normal early repol pattern
Prolonged QT interval
Compared to ECG 2021 08:37:56
No significant change was found
Electronically Signed On 4- 13:05:55 CDT by Quinn Lam
[email or web address]
 
 
 
 
 
 
 
 
 
 
 
 
 
 
 
 
 
 
 
  <ELECTRONICALLY SIGNED>
   By: Quinn Lam MD, [facility]   
  04/15/21     1305
D: 04/15/21 0741                           _____________________________________
T: 04/15/21 0741                           Quinn Lam MD, [facility]     /EPI

## 2021-04-15 NOTE — NUR
chart review. he remains on [facility],  left message for his mom neida [phone]
[phone], requested a call back. will cont following as needed for dc needs.

## 2021-04-15 NOTE — NUR
patient reaches normothermia at 1320. will continue normothermia per
hypothermia protocol. adequate urine output. family (mother and father) at
bedside today to visit patient. patient over breathes ventilator rr settings.
no corneal reflex, no cough/gag, pupils sluggish. continues on sedation. og to
lis. no spontaneous movements.

## 2021-04-16 VITALS — SYSTOLIC BLOOD PRESSURE: 131 MMHG | DIASTOLIC BLOOD PRESSURE: 67 MMHG

## 2021-04-16 VITALS — DIASTOLIC BLOOD PRESSURE: 104 MMHG | SYSTOLIC BLOOD PRESSURE: 180 MMHG

## 2021-04-16 VITALS — SYSTOLIC BLOOD PRESSURE: 128 MMHG | DIASTOLIC BLOOD PRESSURE: 71 MMHG

## 2021-04-16 VITALS — SYSTOLIC BLOOD PRESSURE: 143 MMHG | DIASTOLIC BLOOD PRESSURE: 91 MMHG

## 2021-04-16 VITALS — DIASTOLIC BLOOD PRESSURE: 87 MMHG | SYSTOLIC BLOOD PRESSURE: 137 MMHG

## 2021-04-16 VITALS — DIASTOLIC BLOOD PRESSURE: 73 MMHG | SYSTOLIC BLOOD PRESSURE: 130 MMHG

## 2021-04-16 VITALS — SYSTOLIC BLOOD PRESSURE: 123 MMHG | DIASTOLIC BLOOD PRESSURE: 65 MMHG

## 2021-04-16 VITALS — DIASTOLIC BLOOD PRESSURE: 88 MMHG | SYSTOLIC BLOOD PRESSURE: 140 MMHG

## 2021-04-16 VITALS — DIASTOLIC BLOOD PRESSURE: 90 MMHG | SYSTOLIC BLOOD PRESSURE: 134 MMHG

## 2021-04-16 VITALS — DIASTOLIC BLOOD PRESSURE: 84 MMHG | SYSTOLIC BLOOD PRESSURE: 138 MMHG

## 2021-04-16 VITALS — SYSTOLIC BLOOD PRESSURE: 138 MMHG | DIASTOLIC BLOOD PRESSURE: 89 MMHG

## 2021-04-16 VITALS — DIASTOLIC BLOOD PRESSURE: 86 MMHG | SYSTOLIC BLOOD PRESSURE: 136 MMHG

## 2021-04-16 VITALS — SYSTOLIC BLOOD PRESSURE: 123 MMHG | DIASTOLIC BLOOD PRESSURE: 64 MMHG

## 2021-04-16 VITALS — DIASTOLIC BLOOD PRESSURE: 85 MMHG | SYSTOLIC BLOOD PRESSURE: 141 MMHG

## 2021-04-16 VITALS — DIASTOLIC BLOOD PRESSURE: 77 MMHG | SYSTOLIC BLOOD PRESSURE: 134 MMHG

## 2021-04-16 VITALS — SYSTOLIC BLOOD PRESSURE: 151 MMHG | DIASTOLIC BLOOD PRESSURE: 96 MMHG

## 2021-04-16 VITALS — DIASTOLIC BLOOD PRESSURE: 89 MMHG | SYSTOLIC BLOOD PRESSURE: 143 MMHG

## 2021-04-16 VITALS — SYSTOLIC BLOOD PRESSURE: 143 MMHG | DIASTOLIC BLOOD PRESSURE: 87 MMHG

## 2021-04-16 VITALS — DIASTOLIC BLOOD PRESSURE: 81 MMHG | SYSTOLIC BLOOD PRESSURE: 144 MMHG

## 2021-04-16 VITALS — SYSTOLIC BLOOD PRESSURE: 138 MMHG | DIASTOLIC BLOOD PRESSURE: 76 MMHG

## 2021-04-16 VITALS — DIASTOLIC BLOOD PRESSURE: 90 MMHG | SYSTOLIC BLOOD PRESSURE: 147 MMHG

## 2021-04-16 VITALS — DIASTOLIC BLOOD PRESSURE: 74 MMHG | SYSTOLIC BLOOD PRESSURE: 131 MMHG

## 2021-04-16 VITALS — DIASTOLIC BLOOD PRESSURE: 65 MMHG | SYSTOLIC BLOOD PRESSURE: 125 MMHG

## 2021-04-16 VITALS — DIASTOLIC BLOOD PRESSURE: 73 MMHG | SYSTOLIC BLOOD PRESSURE: 135 MMHG

## 2021-04-16 VITALS — SYSTOLIC BLOOD PRESSURE: 136 MMHG | DIASTOLIC BLOOD PRESSURE: 71 MMHG

## 2021-04-16 VITALS — DIASTOLIC BLOOD PRESSURE: 85 MMHG | SYSTOLIC BLOOD PRESSURE: 143 MMHG

## 2021-04-16 VITALS — SYSTOLIC BLOOD PRESSURE: 143 MMHG | DIASTOLIC BLOOD PRESSURE: 90 MMHG

## 2021-04-16 VITALS — DIASTOLIC BLOOD PRESSURE: 90 MMHG | SYSTOLIC BLOOD PRESSURE: 138 MMHG

## 2021-04-16 VITALS — DIASTOLIC BLOOD PRESSURE: 81 MMHG | SYSTOLIC BLOOD PRESSURE: 135 MMHG

## 2021-04-16 VITALS — SYSTOLIC BLOOD PRESSURE: 134 MMHG | DIASTOLIC BLOOD PRESSURE: 74 MMHG

## 2021-04-16 VITALS — DIASTOLIC BLOOD PRESSURE: 83 MMHG | SYSTOLIC BLOOD PRESSURE: 137 MMHG

## 2021-04-16 VITALS — DIASTOLIC BLOOD PRESSURE: 64 MMHG | SYSTOLIC BLOOD PRESSURE: 123 MMHG

## 2021-04-16 VITALS — DIASTOLIC BLOOD PRESSURE: 84 MMHG | SYSTOLIC BLOOD PRESSURE: 159 MMHG

## 2021-04-16 VITALS — SYSTOLIC BLOOD PRESSURE: 148 MMHG | DIASTOLIC BLOOD PRESSURE: 93 MMHG

## 2021-04-16 VITALS — SYSTOLIC BLOOD PRESSURE: 126 MMHG | DIASTOLIC BLOOD PRESSURE: 69 MMHG

## 2021-04-16 VITALS — DIASTOLIC BLOOD PRESSURE: 84 MMHG | SYSTOLIC BLOOD PRESSURE: 146 MMHG

## 2021-04-16 VITALS — DIASTOLIC BLOOD PRESSURE: 91 MMHG | SYSTOLIC BLOOD PRESSURE: 147 MMHG

## 2021-04-16 VITALS — SYSTOLIC BLOOD PRESSURE: 136 MMHG | DIASTOLIC BLOOD PRESSURE: 84 MMHG

## 2021-04-16 VITALS — SYSTOLIC BLOOD PRESSURE: 177 MMHG | DIASTOLIC BLOOD PRESSURE: 100 MMHG

## 2021-04-16 VITALS — DIASTOLIC BLOOD PRESSURE: 96 MMHG | SYSTOLIC BLOOD PRESSURE: 151 MMHG

## 2021-04-16 VITALS — DIASTOLIC BLOOD PRESSURE: 69 MMHG | SYSTOLIC BLOOD PRESSURE: 128 MMHG

## 2021-04-16 VITALS — DIASTOLIC BLOOD PRESSURE: 70 MMHG | SYSTOLIC BLOOD PRESSURE: 130 MMHG

## 2021-04-16 VITALS — DIASTOLIC BLOOD PRESSURE: 72 MMHG | SYSTOLIC BLOOD PRESSURE: 132 MMHG

## 2021-04-16 VITALS — SYSTOLIC BLOOD PRESSURE: 150 MMHG | DIASTOLIC BLOOD PRESSURE: 92 MMHG

## 2021-04-16 VITALS — SYSTOLIC BLOOD PRESSURE: 142 MMHG | DIASTOLIC BLOOD PRESSURE: 89 MMHG

## 2021-04-16 VITALS — DIASTOLIC BLOOD PRESSURE: 91 MMHG | SYSTOLIC BLOOD PRESSURE: 146 MMHG

## 2021-04-16 VITALS — SYSTOLIC BLOOD PRESSURE: 144 MMHG | DIASTOLIC BLOOD PRESSURE: 91 MMHG

## 2021-04-16 VITALS — DIASTOLIC BLOOD PRESSURE: 75 MMHG | SYSTOLIC BLOOD PRESSURE: 137 MMHG

## 2021-04-16 VITALS — DIASTOLIC BLOOD PRESSURE: 91 MMHG | SYSTOLIC BLOOD PRESSURE: 145 MMHG

## 2021-04-16 VITALS — DIASTOLIC BLOOD PRESSURE: 93 MMHG | SYSTOLIC BLOOD PRESSURE: 144 MMHG

## 2021-04-16 VITALS — SYSTOLIC BLOOD PRESSURE: 138 MMHG | DIASTOLIC BLOOD PRESSURE: 71 MMHG

## 2021-04-16 VITALS — DIASTOLIC BLOOD PRESSURE: 67 MMHG | SYSTOLIC BLOOD PRESSURE: 118 MMHG

## 2021-04-16 VITALS — SYSTOLIC BLOOD PRESSURE: 140 MMHG | DIASTOLIC BLOOD PRESSURE: 90 MMHG

## 2021-04-16 VITALS — SYSTOLIC BLOOD PRESSURE: 134 MMHG | DIASTOLIC BLOOD PRESSURE: 81 MMHG

## 2021-04-16 VITALS — SYSTOLIC BLOOD PRESSURE: 139 MMHG | DIASTOLIC BLOOD PRESSURE: 84 MMHG

## 2021-04-16 VITALS — SYSTOLIC BLOOD PRESSURE: 144 MMHG | DIASTOLIC BLOOD PRESSURE: 76 MMHG

## 2021-04-16 VITALS — SYSTOLIC BLOOD PRESSURE: 144 MMHG | DIASTOLIC BLOOD PRESSURE: 86 MMHG

## 2021-04-16 VITALS — SYSTOLIC BLOOD PRESSURE: 147 MMHG | DIASTOLIC BLOOD PRESSURE: 90 MMHG

## 2021-04-16 VITALS — DIASTOLIC BLOOD PRESSURE: 91 MMHG | SYSTOLIC BLOOD PRESSURE: 142 MMHG

## 2021-04-16 VITALS — DIASTOLIC BLOOD PRESSURE: 99 MMHG | SYSTOLIC BLOOD PRESSURE: 182 MMHG

## 2021-04-16 VITALS — DIASTOLIC BLOOD PRESSURE: 71 MMHG | SYSTOLIC BLOOD PRESSURE: 129 MMHG

## 2021-04-16 VITALS — SYSTOLIC BLOOD PRESSURE: 163 MMHG | DIASTOLIC BLOOD PRESSURE: 87 MMHG

## 2021-04-16 VITALS — DIASTOLIC BLOOD PRESSURE: 82 MMHG | SYSTOLIC BLOOD PRESSURE: 132 MMHG

## 2021-04-16 VITALS — SYSTOLIC BLOOD PRESSURE: 147 MMHG | DIASTOLIC BLOOD PRESSURE: 95 MMHG

## 2021-04-16 VITALS — DIASTOLIC BLOOD PRESSURE: 92 MMHG | SYSTOLIC BLOOD PRESSURE: 156 MMHG

## 2021-04-16 VITALS — DIASTOLIC BLOOD PRESSURE: 90 MMHG | SYSTOLIC BLOOD PRESSURE: 143 MMHG

## 2021-04-16 VITALS — DIASTOLIC BLOOD PRESSURE: 87 MMHG | SYSTOLIC BLOOD PRESSURE: 144 MMHG

## 2021-04-16 VITALS — DIASTOLIC BLOOD PRESSURE: 86 MMHG | SYSTOLIC BLOOD PRESSURE: 139 MMHG

## 2021-04-16 VITALS — DIASTOLIC BLOOD PRESSURE: 83 MMHG | SYSTOLIC BLOOD PRESSURE: 141 MMHG

## 2021-04-16 VITALS — DIASTOLIC BLOOD PRESSURE: 87 MMHG | SYSTOLIC BLOOD PRESSURE: 136 MMHG

## 2021-04-16 VITALS — SYSTOLIC BLOOD PRESSURE: 147 MMHG | DIASTOLIC BLOOD PRESSURE: 91 MMHG

## 2021-04-16 VITALS — DIASTOLIC BLOOD PRESSURE: 75 MMHG | SYSTOLIC BLOOD PRESSURE: 133 MMHG

## 2021-04-16 VITALS — SYSTOLIC BLOOD PRESSURE: 145 MMHG | DIASTOLIC BLOOD PRESSURE: 89 MMHG

## 2021-04-16 VITALS — DIASTOLIC BLOOD PRESSURE: 86 MMHG | SYSTOLIC BLOOD PRESSURE: 140 MMHG

## 2021-04-16 VITALS — SYSTOLIC BLOOD PRESSURE: 130 MMHG | DIASTOLIC BLOOD PRESSURE: 75 MMHG

## 2021-04-16 VITALS — DIASTOLIC BLOOD PRESSURE: 75 MMHG | SYSTOLIC BLOOD PRESSURE: 134 MMHG

## 2021-04-16 VITALS — SYSTOLIC BLOOD PRESSURE: 178 MMHG | DIASTOLIC BLOOD PRESSURE: 103 MMHG

## 2021-04-16 VITALS — SYSTOLIC BLOOD PRESSURE: 147 MMHG | DIASTOLIC BLOOD PRESSURE: 82 MMHG

## 2021-04-16 VITALS — DIASTOLIC BLOOD PRESSURE: 84 MMHG | SYSTOLIC BLOOD PRESSURE: 141 MMHG

## 2021-04-16 VITALS — SYSTOLIC BLOOD PRESSURE: 183 MMHG | DIASTOLIC BLOOD PRESSURE: 103 MMHG

## 2021-04-16 VITALS — DIASTOLIC BLOOD PRESSURE: 92 MMHG | SYSTOLIC BLOOD PRESSURE: 145 MMHG

## 2021-04-16 VITALS — SYSTOLIC BLOOD PRESSURE: 142 MMHG | DIASTOLIC BLOOD PRESSURE: 86 MMHG

## 2021-04-16 VITALS — SYSTOLIC BLOOD PRESSURE: 182 MMHG | DIASTOLIC BLOOD PRESSURE: 103 MMHG

## 2021-04-16 VITALS — SYSTOLIC BLOOD PRESSURE: 148 MMHG | DIASTOLIC BLOOD PRESSURE: 92 MMHG

## 2021-04-16 LAB
ALBUMIN SERPL-MCNC: 2.5 G/DL (ref 3.4–5)
ALT SERPL-CCNC: 89 U/L (ref 16–63)
ANION GAP SERPL CALC-SCNC: 12 MMOL/L (ref 7–16)
ANION GAP SERPL CALC-SCNC: 12 MMOL/L (ref 7–16)
ANION GAP SERPL CALC-SCNC: 14 MMOL/L (ref 7–16)
AST SERPL-CCNC: 85 U/L (ref 15–37)
BILIRUB DIRECT SERPL-MCNC: < 0.1 MG/DL
BILIRUB SERPL-MCNC: 0.3 MG/DL (ref 0.2–1)
BUN SERPL-MCNC: 3 MG/DL (ref 7–18)
BUN SERPL-MCNC: 7 MG/DL (ref 7–18)
BUN SERPL-MCNC: 7 MG/DL (ref 7–18)
CALCIUM SERPL-MCNC: 5 MG/DL (ref 8.5–10.1)
CALCIUM SERPL-MCNC: 7.4 MG/DL (ref 8.5–10.1)
CALCIUM SERPL-MCNC: 7.6 MG/DL (ref 8.5–10.1)
CHLORIDE SERPL-SCNC: 113 MMOL/L (ref 98–107)
CHLORIDE SERPL-SCNC: 117 MMOL/L (ref 98–107)
CHLORIDE SERPL-SCNC: 129 MMOL/L (ref 98–107)
CO2 SERPL-SCNC: 17 MMOL/L (ref 21–32)
CO2 SERPL-SCNC: 21 MMOL/L (ref 21–32)
CO2 SERPL-SCNC: 24 MMOL/L (ref 21–32)
CREAT SERPL-MCNC: 0.7 MG/DL (ref 0.7–1.3)
CREAT SERPL-MCNC: 1.2 MG/DL (ref 0.7–1.3)
CREAT SERPL-MCNC: 1.2 MG/DL (ref 0.7–1.3)
ERYTHROCYTE [DISTWIDTH] IN BLOOD BY AUTOMATED COUNT: 14.8 % (ref 10.5–14.5)
GLUCOSE SERPL-MCNC: 110 MG/DL (ref 74–106)
GLUCOSE SERPL-MCNC: 158 MG/DL (ref 74–106)
GLUCOSE SERPL-MCNC: 88 MG/DL (ref 74–106)
HCT VFR BLD CALC: 37.1 % (ref 42–52)
HGB BLD-MCNC: 13 GM/DL (ref 14–18)
MCH RBC QN AUTO: 31.9 PG (ref 26–34)
MCHC RBC AUTO-ENTMCNC: 34.9 G/DL (ref 28–37)
MCV RBC: 91.4 FL (ref 80–100)
PLATELET # BLD: 204 THOU/UL (ref 150–400)
POTASSIUM SERPL-SCNC: 2.1 MMOL/L (ref 3.5–5.1)
POTASSIUM SERPL-SCNC: 2.9 MMOL/L (ref 3.5–5.1)
POTASSIUM SERPL-SCNC: 3.2 MMOL/L (ref 3.5–5.1)
PROT SERPL-MCNC: 6 G/DL (ref 6.4–8.2)
RBC # BLD AUTO: 4.06 MIL/UL (ref 4.5–6)
SODIUM SERPL-SCNC: 149 MMOL/L (ref 136–145)
SODIUM SERPL-SCNC: 152 MMOL/L (ref 136–145)
SODIUM SERPL-SCNC: 158 MMOL/L (ref 136–145)
WBC # BLD AUTO: 13.9 THOU/UL (ref 4–11)

## 2021-04-16 NOTE — NUR
Nutrition: pt admit to ICU with post cardiac arrest. Low caroline score
indicated and pt NPO x 2 days in ICU. Normothermia has been achieved. Pt
without gag, cough or corneal reflex. Discussion in rounds, plan reassess at
48 hrs post normothermia per neuro. Poor outlook noted and no plans to start
nutrition at present. Will follow.

## 2021-04-16 NOTE — NUR
This RN spoke to Dr. Dailey regarding critical BMP results, advised to call Dr. Rocio Judge for more direction. Critical results called to Dr. Canales at [phone].
New orders received to up D5W rate and to initiate desmopressin Q12 hours.
Also discussed large urine output rate, and clinical appearance. Told to
monitor AM labs and that he will round on patient in AM. Will continue to
monitor.

## 2021-04-16 NOTE — NUR
AT APPROX 0230 PT BECAME HYPERTENSIVE. WHEN THIS RN CHECKED HIS PULIPS THEY
WERE FIXED AND DILATED. I CALLED SHER SPEARS WHOM SUGGESTED I CALL NEURO. AT
APPROX 0430 ON CALL NEUROLOGIST RECOMMENDED A REPEAT NONCONTRAST CT. DR MOSQUERA
CALLED AND UPDATED OF PLAN. CT SCHEDULED FOR 0800.

## 2021-04-16 NOTE — NUR
PATIENT NOT PROGRESSING TOWARDS PLAN OF CARE. PATIENT TAKEN TO CT AT 0815 FOR
NEURO STATUS CHANGES. DR STEEN SPOKE WITH PATIENT MOTHER ABOUT PATIENT
STATUS. MAINTAINED PATIENT MIDLINE AND HOB 30 DEGREES. SEE NEURO ASSESSMENTS
FOR MORE INDEPTH ASSESSEMENT. PATIENT RECIEVING POTASSIUM REPLACEMENT. LARGE
URINE OUTPUT. DR MOSQUERA CONSULTED RENAL FOR SUSPECTED DIABETES INSIPIDUS.
NORMOTHERMIA TO BE MAINTAINED FOR 48HOURS. WILL BE COMPLETE AT 1330 4/17.
MOTHER SAW PATIENT TODAY. UPDATED MTN TODAY TWICE ABOUT PATIENT STATUS.

## 2021-04-16 NOTE — NUR
PATIENT'S  IS BISHOP JUANITA WOODARD FROM [facility]. APPRENTLY THERE ARE OTHER MINISTERS "IN
THE FAMILY", BUT THEY WILL NEED TO GET ADMINSTRATIVE APPROVAL AS A FAMILY
MEMBER.
 
IF APPROVED THAT PERSON WOULD BE ONE OF THE TWO VISITORS ALLOWED
IN THE ROOM WITH THE PATIENT.
 
CHAPLAIN SHU MERCEDES

## 2021-04-17 VITALS — SYSTOLIC BLOOD PRESSURE: 100 MMHG | DIASTOLIC BLOOD PRESSURE: 56 MMHG

## 2021-04-17 VITALS — DIASTOLIC BLOOD PRESSURE: 89 MMHG | SYSTOLIC BLOOD PRESSURE: 143 MMHG

## 2021-04-17 VITALS — DIASTOLIC BLOOD PRESSURE: 60 MMHG | SYSTOLIC BLOOD PRESSURE: 102 MMHG

## 2021-04-17 VITALS — SYSTOLIC BLOOD PRESSURE: 87 MMHG | DIASTOLIC BLOOD PRESSURE: 41 MMHG

## 2021-04-17 VITALS — SYSTOLIC BLOOD PRESSURE: 132 MMHG | DIASTOLIC BLOOD PRESSURE: 84 MMHG

## 2021-04-17 VITALS — DIASTOLIC BLOOD PRESSURE: 106 MMHG | SYSTOLIC BLOOD PRESSURE: 177 MMHG

## 2021-04-17 VITALS — DIASTOLIC BLOOD PRESSURE: 100 MMHG | SYSTOLIC BLOOD PRESSURE: 163 MMHG

## 2021-04-17 VITALS — SYSTOLIC BLOOD PRESSURE: 96 MMHG | DIASTOLIC BLOOD PRESSURE: 53 MMHG

## 2021-04-17 VITALS — SYSTOLIC BLOOD PRESSURE: 157 MMHG | DIASTOLIC BLOOD PRESSURE: 102 MMHG

## 2021-04-17 VITALS — SYSTOLIC BLOOD PRESSURE: 93 MMHG | DIASTOLIC BLOOD PRESSURE: 51 MMHG

## 2021-04-17 VITALS — DIASTOLIC BLOOD PRESSURE: 92 MMHG | SYSTOLIC BLOOD PRESSURE: 148 MMHG

## 2021-04-17 VITALS — DIASTOLIC BLOOD PRESSURE: 68 MMHG | SYSTOLIC BLOOD PRESSURE: 109 MMHG

## 2021-04-17 VITALS — SYSTOLIC BLOOD PRESSURE: 101 MMHG | DIASTOLIC BLOOD PRESSURE: 56 MMHG

## 2021-04-17 VITALS — DIASTOLIC BLOOD PRESSURE: 107 MMHG | SYSTOLIC BLOOD PRESSURE: 179 MMHG

## 2021-04-17 VITALS — SYSTOLIC BLOOD PRESSURE: 172 MMHG | DIASTOLIC BLOOD PRESSURE: 108 MMHG

## 2021-04-17 VITALS — SYSTOLIC BLOOD PRESSURE: 158 MMHG | DIASTOLIC BLOOD PRESSURE: 90 MMHG

## 2021-04-17 VITALS — SYSTOLIC BLOOD PRESSURE: 120 MMHG | DIASTOLIC BLOOD PRESSURE: 76 MMHG

## 2021-04-17 VITALS — DIASTOLIC BLOOD PRESSURE: 47 MMHG | SYSTOLIC BLOOD PRESSURE: 92 MMHG

## 2021-04-17 VITALS — SYSTOLIC BLOOD PRESSURE: 132 MMHG | DIASTOLIC BLOOD PRESSURE: 78 MMHG

## 2021-04-17 VITALS — DIASTOLIC BLOOD PRESSURE: 54 MMHG | SYSTOLIC BLOOD PRESSURE: 100 MMHG

## 2021-04-17 VITALS — SYSTOLIC BLOOD PRESSURE: 137 MMHG | DIASTOLIC BLOOD PRESSURE: 81 MMHG

## 2021-04-17 VITALS — DIASTOLIC BLOOD PRESSURE: 86 MMHG | SYSTOLIC BLOOD PRESSURE: 136 MMHG

## 2021-04-17 VITALS — DIASTOLIC BLOOD PRESSURE: 88 MMHG | SYSTOLIC BLOOD PRESSURE: 138 MMHG

## 2021-04-17 LAB
ANION GAP SERPL CALC-SCNC: 10 MMOL/L (ref 7–16)
ANION GAP SERPL CALC-SCNC: 8 MMOL/L (ref 7–16)
BUN SERPL-MCNC: 4 MG/DL (ref 7–18)
BUN SERPL-MCNC: 4 MG/DL (ref 7–18)
CALCIUM SERPL-MCNC: 7.9 MG/DL (ref 8.5–10.1)
CALCIUM SERPL-MCNC: 8 MG/DL (ref 8.5–10.1)
CHLORIDE SERPL-SCNC: 109 MMOL/L (ref 98–107)
CHLORIDE SERPL-SCNC: 121 MMOL/L (ref 98–107)
CO2 SERPL-SCNC: 25 MMOL/L (ref 21–32)
CO2 SERPL-SCNC: 25 MMOL/L (ref 21–32)
CREAT SERPL-MCNC: 1.3 MG/DL (ref 0.7–1.3)
CREAT SERPL-MCNC: 1.5 MG/DL (ref 0.7–1.3)
ERYTHROCYTE [DISTWIDTH] IN BLOOD BY AUTOMATED COUNT: 14.7 % (ref 10.5–14.5)
GLUCOSE SERPL-MCNC: 145 MG/DL (ref 74–106)
GLUCOSE SERPL-MCNC: 164 MG/DL (ref 74–106)
HCT VFR BLD CALC: 24.2 % (ref 42–52)
HGB BLD-MCNC: 8.1 GM/DL (ref 14–18)
MCH RBC QN AUTO: 30.6 PG (ref 26–34)
MCHC RBC AUTO-ENTMCNC: 33.3 G/DL (ref 28–37)
MCV RBC: 92 FL (ref 80–100)
PLATELET # BLD: 106 THOU/UL (ref 150–400)
POTASSIUM SERPL-SCNC: 3.2 MMOL/L (ref 3.5–5.1)
POTASSIUM SERPL-SCNC: 3.6 MMOL/L (ref 3.5–5.1)
RBC # BLD AUTO: 2.63 MIL/UL (ref 4.5–6)
SODIUM SERPL-SCNC: 144 MMOL/L (ref 136–145)
SODIUM SERPL-SCNC: 154 MMOL/L (ref 136–145)
WBC # BLD AUTO: 5.9 THOU/UL (ref 4–11)

## 2021-04-17 NOTE — NUR
This RN spoke to MTN this morning at 0538. Updated on patient status and
current plan. Was told to call with any changes in status or plan.

## 2021-04-17 NOTE — NUR
PT NOT PROGRESSING TOWARDS DISCHARGE, ARCTIC SUN, PADDING TAKEN OFF AT 1330
PER PROTOCOL, AFTER THAT, PT WAS NOT REGULATING ON TEMPERATURE, BAIRHUGGER HAD
TO BE PLACED. CURRENTLY AT 35.7C NEUROLOGIST SAW THE PT WHILE FAMILY WAS IN
ROOM, PLAN MOVING FORWARD IS TO DECREASE/DC SEDATION SO MOST ACCURATE READING
OF NEUROLOGIC STATUS CAN BE ASSESSED. FAMILY TO SEE THE NEUROLOGIST AT 1000
TOMORROW MORNING FOR THIS ASSESSMENT. PT'S CURRENT CLINICAL PICTURE SHOWS
AFTER TITRATING PROPOFOL FROM 40 TO 0 NO CHANGES IN SEDATION, ONLY BLOOD
PRESSURE AROSE. RN CONTACTED THE NEUROLOGIST, IN RELATION TO CEREBRAL EDEMA,
PER , SHE WOULD LIKE THE BP AROUND 140/90 AND WANTED PROPOFOL ON AT
10 AROUND THE CLOCK UNTIL 30 MINUTES PRIOR TO 1000 TOMORROW MORNING AS WELL
AS, NOC RN TO TITRATE THE VERSED DOWN AS POSSIBLE WHILE PT IS NOT PRESENTING
SZR LIKE ACTIVITIES. APPEARS AS IF FAMILY WILL HAVE MORE INFORMATION TO CREATE
A PLAN OF CARE FOR THE PT AFTER TOMORROWS MEETING WITH NEURO. RN TO CONTINUE
POC, AND PROMOTE COMFORT.

## 2021-04-18 VITALS — DIASTOLIC BLOOD PRESSURE: 85 MMHG | SYSTOLIC BLOOD PRESSURE: 142 MMHG

## 2021-04-18 VITALS — DIASTOLIC BLOOD PRESSURE: 64 MMHG | SYSTOLIC BLOOD PRESSURE: 108 MMHG

## 2021-04-18 VITALS — SYSTOLIC BLOOD PRESSURE: 138 MMHG | DIASTOLIC BLOOD PRESSURE: 89 MMHG

## 2021-04-18 VITALS — SYSTOLIC BLOOD PRESSURE: 143 MMHG | DIASTOLIC BLOOD PRESSURE: 91 MMHG

## 2021-04-18 VITALS — SYSTOLIC BLOOD PRESSURE: 129 MMHG | DIASTOLIC BLOOD PRESSURE: 82 MMHG

## 2021-04-18 VITALS — SYSTOLIC BLOOD PRESSURE: 135 MMHG | DIASTOLIC BLOOD PRESSURE: 82 MMHG

## 2021-04-18 VITALS — SYSTOLIC BLOOD PRESSURE: 130 MMHG | DIASTOLIC BLOOD PRESSURE: 81 MMHG

## 2021-04-18 VITALS — SYSTOLIC BLOOD PRESSURE: 107 MMHG | DIASTOLIC BLOOD PRESSURE: 57 MMHG

## 2021-04-18 VITALS — DIASTOLIC BLOOD PRESSURE: 77 MMHG | SYSTOLIC BLOOD PRESSURE: 124 MMHG

## 2021-04-18 VITALS — SYSTOLIC BLOOD PRESSURE: 139 MMHG | DIASTOLIC BLOOD PRESSURE: 84 MMHG

## 2021-04-18 VITALS — SYSTOLIC BLOOD PRESSURE: 125 MMHG | DIASTOLIC BLOOD PRESSURE: 73 MMHG

## 2021-04-18 VITALS — DIASTOLIC BLOOD PRESSURE: 77 MMHG | SYSTOLIC BLOOD PRESSURE: 125 MMHG

## 2021-04-18 VITALS — DIASTOLIC BLOOD PRESSURE: 64 MMHG | SYSTOLIC BLOOD PRESSURE: 111 MMHG

## 2021-04-18 VITALS — DIASTOLIC BLOOD PRESSURE: 76 MMHG | SYSTOLIC BLOOD PRESSURE: 126 MMHG

## 2021-04-18 VITALS — DIASTOLIC BLOOD PRESSURE: 88 MMHG | SYSTOLIC BLOOD PRESSURE: 133 MMHG

## 2021-04-18 VITALS — DIASTOLIC BLOOD PRESSURE: 89 MMHG | SYSTOLIC BLOOD PRESSURE: 147 MMHG

## 2021-04-18 VITALS — DIASTOLIC BLOOD PRESSURE: 88 MMHG | SYSTOLIC BLOOD PRESSURE: 143 MMHG

## 2021-04-18 VITALS — DIASTOLIC BLOOD PRESSURE: 82 MMHG | SYSTOLIC BLOOD PRESSURE: 132 MMHG

## 2021-04-18 VITALS — SYSTOLIC BLOOD PRESSURE: 135 MMHG | DIASTOLIC BLOOD PRESSURE: 83 MMHG

## 2021-04-18 VITALS — DIASTOLIC BLOOD PRESSURE: 91 MMHG | SYSTOLIC BLOOD PRESSURE: 135 MMHG

## 2021-04-18 VITALS — DIASTOLIC BLOOD PRESSURE: 88 MMHG | SYSTOLIC BLOOD PRESSURE: 138 MMHG

## 2021-04-18 VITALS — SYSTOLIC BLOOD PRESSURE: 138 MMHG | DIASTOLIC BLOOD PRESSURE: 86 MMHG

## 2021-04-18 VITALS — SYSTOLIC BLOOD PRESSURE: 104 MMHG | DIASTOLIC BLOOD PRESSURE: 53 MMHG

## 2021-04-18 VITALS — DIASTOLIC BLOOD PRESSURE: 71 MMHG | SYSTOLIC BLOOD PRESSURE: 116 MMHG

## 2021-04-18 LAB
ALBUMIN SERPL-MCNC: 1.7 G/DL (ref 3.4–5)
ALBUMIN SERPL-MCNC: 1.9 G/DL (ref 3.4–5)
ALT SERPL-CCNC: 39 U/L (ref 16–63)
ALT SERPL-CCNC: 48 U/L (ref 16–63)
AMYLASE SERPL-CCNC: 50 U/L (ref 25–115)
AMYLASE SERPL-CCNC: 50 U/L (ref 25–115)
ANION GAP SERPL CALC-SCNC: 8 MMOL/L (ref 7–16)
APTT BLD: 33.1 SECONDS (ref 24.5–32.8)
APTT BLD: 37.9 SECONDS (ref 24.5–32.8)
AST SERPL-CCNC: 22 U/L (ref 15–37)
AST SERPL-CCNC: 26 U/L (ref 15–37)
BASOPHILS NFR BLD AUTO: 0.4 % (ref 0–2)
BASOPHILS NFR BLD AUTO: 0.5 % (ref 0–2)
BE(VIVO): -0.4 MMOL/L
BILIRUB DIRECT SERPL-MCNC: 0.2 MG/DL
BILIRUB DIRECT SERPL-MCNC: 0.2 MG/DL
BILIRUB SERPL-MCNC: 0.6 MG/DL (ref 0.2–1)
BILIRUB SERPL-MCNC: 0.6 MG/DL (ref 0.2–1)
BILIRUB UR-MCNC: NEGATIVE MG/DL
BUN SERPL-MCNC: 2 MG/DL (ref 7–18)
BUN SERPL-MCNC: 3 MG/DL (ref 7–18)
BUN SERPL-MCNC: 5 MG/DL (ref 7–18)
CALCIUM SERPL-MCNC: 7.5 MG/DL (ref 8.5–10.1)
CALCIUM SERPL-MCNC: 7.6 MG/DL (ref 8.5–10.1)
CALCIUM SERPL-MCNC: 7.6 MG/DL (ref 8.5–10.1)
CHLORIDE SERPL-SCNC: 103 MMOL/L (ref 98–107)
CHLORIDE SERPL-SCNC: 104 MMOL/L (ref 98–107)
CHLORIDE SERPL-SCNC: 105 MMOL/L (ref 98–107)
CO2 SERPL-SCNC: 25 MMOL/L (ref 21–32)
CO2 SERPL-SCNC: 25 MMOL/L (ref 21–32)
CO2 SERPL-SCNC: 26 MMOL/L (ref 21–32)
COLOR UR: YELLOW
CREAT SERPL-MCNC: 1.1 MG/DL (ref 0.7–1.3)
CREAT SERPL-MCNC: 1.2 MG/DL (ref 0.7–1.3)
CREAT SERPL-MCNC: 1.4 MG/DL (ref 0.7–1.3)
EOSINOPHIL NFR BLD: 3.5 % (ref 0–3)
EOSINOPHIL NFR BLD: 3.9 % (ref 0–3)
ERYTHROCYTE [DISTWIDTH] IN BLOOD BY AUTOMATED COUNT: 14.3 % (ref 10.5–14.5)
ERYTHROCYTE [DISTWIDTH] IN BLOOD BY AUTOMATED COUNT: 14.6 % (ref 10.5–14.5)
ERYTHROCYTE [DISTWIDTH] IN BLOOD BY AUTOMATED COUNT: 14.8 % (ref 10.5–14.5)
FIBRINOGEN PPP-MCNC: > 900 MG/DL (ref 210–360)
GGTP: 50 U/L (ref 15–85)
GGTP: 60 U/L (ref 15–85)
GLUCOSE SERPL-MCNC: 106 MG/DL (ref 74–106)
GLUCOSE SERPL-MCNC: 153 MG/DL (ref 74–106)
GLUCOSE SERPL-MCNC: 76 MG/DL (ref 74–106)
GRANULOCYTES NFR BLD MANUAL: 58.1 % (ref 36–66)
GRANULOCYTES NFR BLD MANUAL: 62.5 % (ref 36–66)
HCO3 BLD-SCNC: 22 MMOL/L (ref 22–26)
HCT VFR BLD CALC: 32.6 % (ref 42–52)
HCT VFR BLD CALC: 33.5 % (ref 42–52)
HCT VFR BLD CALC: 34.3 % (ref 42–52)
HGB BLD-MCNC: 10.6 GM/DL (ref 14–18)
HGB BLD-MCNC: 11.5 GM/DL (ref 14–18)
HGB BLD-MCNC: 11.9 GM/DL (ref 14–18)
INR PPP: 0.91
INR PPP: 0.94
KETONES UR STRIP-MCNC: NEGATIVE MG/DL
LIPASE: 45 U/L (ref 73–393)
LIPASE: 47 U/L (ref 73–393)
LYMPHOCYTES NFR BLD AUTO: 23.6 % (ref 24–44)
LYMPHOCYTES NFR BLD AUTO: 27.8 % (ref 24–44)
MAGNESIUM SERPL-MCNC: 1.3 MG/DL (ref 1.8–2.4)
MAGNESIUM SERPL-MCNC: 1.8 MG/DL (ref 1.8–2.4)
MCH RBC QN AUTO: 30 PG (ref 26–34)
MCH RBC QN AUTO: 31 PG (ref 26–34)
MCH RBC QN AUTO: 31.1 PG (ref 26–34)
MCHC RBC AUTO-ENTMCNC: 32.5 G/DL (ref 28–37)
MCHC RBC AUTO-ENTMCNC: 34.3 G/DL (ref 28–37)
MCHC RBC AUTO-ENTMCNC: 34.6 G/DL (ref 28–37)
MCV RBC: 89.6 FL (ref 80–100)
MCV RBC: 90.6 FL (ref 80–100)
MCV RBC: 92.3 FL (ref 80–100)
MONOCYTES NFR BLD: 9.8 % (ref 1–8)
MONOCYTES NFR BLD: 9.9 % (ref 1–8)
NEUTROPHILS # BLD: 3.2 THOU/UL (ref 1.4–8.2)
NEUTROPHILS # BLD: 4.1 THOU/UL (ref 1.4–8.2)
PCO2 BLD: 29.2 MMHG (ref 35–45)
PHOSPHATE SERPL-MCNC: 3.4 MG/DL (ref 2.6–4.7)
PHOSPHATE SERPL-MCNC: 3.5 MG/DL (ref 2.6–4.7)
PLATELET # BLD: 127 THOU/UL (ref 150–400)
PLATELET # BLD: 168 THOU/UL (ref 150–400)
PLATELET # BLD: 174 THOU/UL (ref 150–400)
PO2 BLD: 110.2 MMHG (ref 80–100)
POTASSIUM SERPL-SCNC: 3.1 MMOL/L (ref 3.5–5.1)
PROT SERPL-MCNC: 5.5 G/DL (ref 6.4–8.2)
PROT SERPL-MCNC: 5.9 G/DL (ref 6.4–8.2)
PROTHROMBIN TIME: 10 SECONDS (ref 9.3–11.4)
PROTHROMBIN TIME: 10.3 SECONDS (ref 9.3–11.4)
RBC # BLD AUTO: 3.53 MIL/UL (ref 4.5–6)
RBC # BLD AUTO: 3.7 MIL/UL (ref 4.5–6)
RBC # BLD AUTO: 3.83 MIL/UL (ref 4.5–6)
RBC # UR STRIP: NEGATIVE /UL
SODIUM SERPL-SCNC: 136 MMOL/L (ref 136–145)
SODIUM SERPL-SCNC: 137 MMOL/L (ref 136–145)
SODIUM SERPL-SCNC: 139 MMOL/L (ref 136–145)
SP GR UR STRIP: 1.01 (ref 1–1.03)
URINE CLARITY: CLEAR
URINE GLUCOSE-RANDOM*: NEGATIVE
URINE LEUKOCYTES-REFLEX: NEGATIVE
URINE NITRITE-REFLEX: NEGATIVE
URINE PROTEIN (DIPSTICK): NEGATIVE
UROBILINOGEN UR STRIP-ACNC: 0.2 E.U./DL (ref 0.2–1)
WBC # BLD AUTO: 5.5 THOU/UL (ref 4–11)
WBC # BLD AUTO: 6.6 THOU/UL (ref 4–11)
WBC # BLD AUTO: 7 THOU/UL (ref 4–11)

## 2021-04-18 NOTE — NUR
PTS FAMILY HAD A DISCUSSION WITH THE NEUROLOGIST TODAY WITH RN PRESENT IN THE
ROOM. PER PT'S FAMILY'S REQUEST THEY DO NOT WISH TO PURSUE FURTHER CARE FOR
THE PT AND OPTED FOR COMFORT CARE. [facility] HAD STEPPED IN AFTER COMFORT CARE
DECISION WAS MADE AND SPOKE WITH THE FAMILY., REP YAAKOV WAS HERE. FAMILY
DECIDED FOR ORGAN DONATION FOR THE FAMILY. THROUGHOUT THE DAY THIS RN WORKED
WITH TWO STAFF MEMBERS FROM [facility] [employer] TO APPROPERIATELY ORDER ALL LABS
REQUIRED FOR THIS PT. PT IS EXPECTED TO GET A NEURO EVAL SOMETIME TOMORROW
MORNING INCLUDING AN APNEA TEST, AND ONCE FINALIZED [facility] WILL THEN TAKE THIS PT
OVER AND ORGAN DONATION PROCESS IS EXPECTED TO PROCEED. RN CONTINUING WITH
CARE/ORDERS AT THIS POINT UNTIL THEN

## 2021-04-18 NOTE — NUR
This RN spoke to [facility] at 0350. Updated on status, medications and clinical
presentations. Was told a  from [facility] will be visiting at 0800.

## 2021-04-19 ENCOUNTER — HOSPITAL ENCOUNTER (OUTPATIENT)
Dept: HOSPITAL 35 - ICUDONOR | Age: 41
Setting detail: OBSERVATION
LOS: 2 days | Discharge: NURSING, REHAB OR LONG-TERM CARE | End: 2021-04-21
Payer: COMMERCIAL

## 2021-04-19 VITALS — SYSTOLIC BLOOD PRESSURE: 93 MMHG | DIASTOLIC BLOOD PRESSURE: 57 MMHG

## 2021-04-19 VITALS — DIASTOLIC BLOOD PRESSURE: 77 MMHG | SYSTOLIC BLOOD PRESSURE: 118 MMHG

## 2021-04-19 VITALS — DIASTOLIC BLOOD PRESSURE: 68 MMHG | SYSTOLIC BLOOD PRESSURE: 108 MMHG

## 2021-04-19 VITALS — DIASTOLIC BLOOD PRESSURE: 66 MMHG | SYSTOLIC BLOOD PRESSURE: 102 MMHG

## 2021-04-19 VITALS — DIASTOLIC BLOOD PRESSURE: 66 MMHG | SYSTOLIC BLOOD PRESSURE: 103 MMHG

## 2021-04-19 VITALS — DIASTOLIC BLOOD PRESSURE: 70 MMHG | SYSTOLIC BLOOD PRESSURE: 106 MMHG

## 2021-04-19 VITALS — DIASTOLIC BLOOD PRESSURE: 70 MMHG | SYSTOLIC BLOOD PRESSURE: 101 MMHG

## 2021-04-19 DIAGNOSIS — I10: ICD-10-CM

## 2021-04-19 DIAGNOSIS — Z87.891: ICD-10-CM

## 2021-04-19 DIAGNOSIS — Z00.5: Primary | ICD-10-CM

## 2021-04-19 DIAGNOSIS — Z20.822: ICD-10-CM

## 2021-04-19 DIAGNOSIS — G93.1: ICD-10-CM

## 2021-04-19 LAB
ALBUMIN SERPL-MCNC: 1.7 G/DL (ref 3.4–5)
ALBUMIN SERPL-MCNC: 1.9 G/DL (ref 3.4–5)
ALT SERPL-CCNC: 34 U/L (ref 16–63)
ALT SERPL-CCNC: 38 U/L (ref 30–65)
ALT SERPL-CCNC: 39 U/L (ref 16–63)
ALT SERPL-CCNC: 40 U/L (ref 16–63)
AMYLASE SERPL-CCNC: 218 U/L (ref 25–115)
AMYLASE SERPL-CCNC: 50 U/L (ref 25–115)
AMYLASE SERPL-CCNC: 51 U/L (ref 25–115)
AMYLASE SERPL-CCNC: 52 U/L (ref 25–115)
ANION GAP SERPL CALC-SCNC: 5 MMOL/L (ref 7–16)
ANION GAP SERPL CALC-SCNC: 9 MMOL/L (ref 7–16)
ANISOCYTOSIS BLD QL SMEAR: (no result)
APTT BLD: 31.8 SECONDS (ref 24.5–32.8)
APTT BLD: 33.2 SECONDS (ref 24.5–32.8)
APTT BLD: 34.7 SECONDS (ref 24.5–32.8)
AST SERPL-CCNC: 26 U/L (ref 15–37)
AST SERPL-CCNC: 29 U/L (ref 15–37)
AST SERPL-CCNC: 36 U/L (ref 15–37)
AST SERPL-CCNC: 51 U/L (ref 15–37)
BASOPHILS NFR BLD AUTO: 0 % (ref 0–2)
BASOPHILS NFR BLD AUTO: 0.4 % (ref 0–2)
BE(VIVO): -2.8 MMOL/L
BE(VIVO): -2.9 MMOL/L
BE(VIVO): 0.3 MMOL/L
BILIRUB DIRECT SERPL-MCNC: 0.1 MG/DL
BILIRUB DIRECT SERPL-MCNC: 0.2 MG/DL
BILIRUB SERPL-MCNC: 0.4 MG/DL (ref 0.2–1)
BILIRUB SERPL-MCNC: 0.5 MG/DL (ref 0.2–1)
BILIRUB SERPL-MCNC: 0.5 MG/DL (ref 0.2–1)
BILIRUB SERPL-MCNC: 0.6 MG/DL (ref 0.2–1)
BUN SERPL-MCNC: 6 MG/DL (ref 7–18)
BUN SERPL-MCNC: 7 MG/DL (ref 7–18)
CALCIUM SERPL-MCNC: 7.6 MG/DL (ref 8.5–10.1)
CALCIUM SERPL-MCNC: 7.8 MG/DL (ref 8.5–10.1)
CHLORIDE SERPL-SCNC: 103 MMOL/L (ref 98–107)
CHLORIDE SERPL-SCNC: 104 MMOL/L (ref 98–107)
CO2 SERPL-SCNC: 24 MMOL/L (ref 21–32)
CO2 SERPL-SCNC: 24 MMOL/L (ref 21–32)
CREAT SERPL-MCNC: 1.4 MG/DL (ref 0.7–1.3)
CREAT SERPL-MCNC: 1.4 MG/DL (ref 0.7–1.3)
EOSINOPHIL NFR BLD: 0 % (ref 0–3)
EOSINOPHIL NFR BLD: 0.5 % (ref 0–3)
EOSINOPHIL NFR BLD: 3.4 % (ref 0–3)
EOSINOPHIL NFR BLD: 5 % (ref 0–3)
ERYTHROCYTE [DISTWIDTH] IN BLOOD BY AUTOMATED COUNT: 14.1 % (ref 10.5–14.5)
ERYTHROCYTE [DISTWIDTH] IN BLOOD BY AUTOMATED COUNT: 14.5 % (ref 10.5–14.5)
ERYTHROCYTE [DISTWIDTH] IN BLOOD BY AUTOMATED COUNT: 14.5 % (ref 10.5–14.5)
ERYTHROCYTE [DISTWIDTH] IN BLOOD BY AUTOMATED COUNT: 14.6 % (ref 10.5–14.5)
FIBRINOGEN PPP-MCNC: 778 MG/DL (ref 210–360)
FIBRINOGEN PPP-MCNC: > 860 MG/DL (ref 210–360)
GGTP: 108 U/L (ref 15–85)
GGTP: 117 U/L (ref 15–85)
GGTP: 143 U/L (ref 15–85)
GGTP: 84 U/L (ref 15–85)
GLUCOSE SERPL-MCNC: 102 MG/DL (ref 74–106)
GLUCOSE SERPL-MCNC: 114 MG/DL (ref 74–106)
GRANULOCYTES NFR BLD MANUAL: 50 % (ref 36–66)
GRANULOCYTES NFR BLD MANUAL: 56 % (ref 36–66)
GRANULOCYTES NFR BLD MANUAL: 58.2 % (ref 36–66)
GRANULOCYTES NFR BLD MANUAL: 94.5 % (ref 36–66)
HCO3 BLD-SCNC: 23.1 MMOL/L (ref 22–26)
HCO3 BLD-SCNC: 24.1 MMOL/L (ref 22–26)
HCO3 BLD-SCNC: 25.3 MMOL/L (ref 22–26)
HCT VFR BLD CALC: 31 % (ref 42–52)
HCT VFR BLD CALC: 32.7 % (ref 42–52)
HCT VFR BLD CALC: 32.8 % (ref 42–52)
HCT VFR BLD CALC: 36.3 % (ref 42–52)
HGB BLD-MCNC: 10.8 GM/DL (ref 14–18)
HGB BLD-MCNC: 11 GM/DL (ref 14–18)
HGB BLD-MCNC: 11 GM/DL (ref 14–18)
HGB BLD-MCNC: 12.6 GM/DL (ref 14–18)
INR PPP: 0.92
INR PPP: 0.94
LIPASE: 48 U/L (ref 73–393)
LIPASE: 49 U/L (ref 73–393)
LIPASE: 53 U/L (ref 73–393)
LIPASE: 63 U/L (ref 73–393)
LYMPHOCYTES NFR BLD AUTO: 25.6 % (ref 24–44)
LYMPHOCYTES NFR BLD AUTO: 28 % (ref 24–44)
LYMPHOCYTES NFR BLD AUTO: 3 % (ref 24–44)
LYMPHOCYTES NFR BLD AUTO: 40 % (ref 24–44)
MAGNESIUM SERPL-MCNC: 2 MG/DL (ref 1.8–2.4)
MAGNESIUM SERPL-MCNC: 2.1 MG/DL (ref 1.8–2.4)
MAGNESIUM SERPL-MCNC: 2.3 MG/DL (ref 1.8–2.4)
MAGNESIUM SERPL-MCNC: 2.3 MG/DL (ref 1.8–2.4)
MCH RBC QN AUTO: 30.2 PG (ref 26–34)
MCH RBC QN AUTO: 30.6 PG (ref 26–34)
MCH RBC QN AUTO: 31.2 PG (ref 26–34)
MCH RBC QN AUTO: 31.2 PG (ref 26–34)
MCHC RBC AUTO-ENTMCNC: 33.5 G/DL (ref 28–37)
MCHC RBC AUTO-ENTMCNC: 33.5 G/DL (ref 28–37)
MCHC RBC AUTO-ENTMCNC: 34.6 G/DL (ref 28–37)
MCHC RBC AUTO-ENTMCNC: 34.9 G/DL (ref 28–37)
MCV RBC: 89.5 FL (ref 80–100)
MCV RBC: 90.2 FL (ref 80–100)
MCV RBC: 90.3 FL (ref 80–100)
MCV RBC: 91.3 FL (ref 80–100)
MONOCYTES NFR BLD: 12.4 % (ref 1–8)
MONOCYTES NFR BLD: 2 % (ref 1–8)
MONOCYTES NFR BLD: 7 % (ref 1–8)
MONOCYTES NFR BLD: 8 % (ref 1–8)
NEUTROPHILS # BLD: 2.5 THOU/UL (ref 1.4–8.2)
NEUTROPHILS # BLD: 3 THOU/UL (ref 1.4–8.2)
NEUTROPHILS # BLD: 3.1 THOU/UL (ref 1.4–8.2)
NEUTROPHILS # BLD: 7.1 THOU/UL (ref 1.4–8.2)
NEUTS BAND NFR BLD: 2 % (ref 0–8)
NEUTS BAND NFR BLD: 4 % (ref 0–8)
PCO2 BLD: 31.5 MMHG (ref 35–45)
PCO2 BLD: 50.2 MMHG (ref 35–45)
PCO2 BLD: 60.5 MMHG (ref 35–45)
PHOSPHATE SERPL-MCNC: 3.4 MG/DL (ref 2.6–4.7)
PHOSPHATE SERPL-MCNC: 3.9 MG/DL (ref 2.5–4.9)
PHOSPHATE SERPL-MCNC: 4.1 MG/DL (ref 2.6–4.7)
PHOSPHATE SERPL-MCNC: 4.4 MG/DL (ref 2.6–4.7)
PLATELET # BLD EST: NORMAL 10*3/UL
PLATELET # BLD EST: NORMAL 10*3/UL
PLATELET # BLD: 146 THOU/UL (ref 150–400)
PLATELET # BLD: 148 THOU/UL (ref 150–400)
PLATELET # BLD: 153 THOU/UL (ref 150–400)
PLATELET # BLD: 167 THOU/UL (ref 150–400)
PO2 BLD: 113.8 MMHG (ref 80–100)
PO2 BLD: 175.3 MMHG (ref 80–100)
PO2 BLD: 191.7 MMHG (ref 80–100)
POLYCHROMASIA BLD QL SMEAR: SLIGHT
POTASSIUM SERPL-SCNC: 3.5 MMOL/L (ref 3.5–5.1)
POTASSIUM SERPL-SCNC: 3.7 MMOL/L (ref 3.5–5.1)
PROT SERPL-MCNC: 5.5 G/DL (ref 6.4–8.2)
PROT SERPL-MCNC: 5.6 G/DL (ref 6.4–8.2)
PROT SERPL-MCNC: 5.6 G/DL (ref 6.4–8.2)
PROT SERPL-MCNC: 6.5 G/DL (ref 6.4–8.2)
PROTHROMBIN TIME: 10.1 SECONDS (ref 9.3–11.4)
PROTHROMBIN TIME: 10.3 SECONDS (ref 9.3–11.4)
RBC # BLD AUTO: 3.46 MIL/UL (ref 4.5–6)
RBC # BLD AUTO: 3.58 MIL/UL (ref 4.5–6)
RBC # BLD AUTO: 3.63 MIL/UL (ref 4.5–6)
RBC # BLD AUTO: 4.03 MIL/UL (ref 4.5–6)
RBC MORPH BLD: NORMAL
SODIUM SERPL-SCNC: 132 MMOL/L (ref 136–145)
SODIUM SERPL-SCNC: 137 MMOL/L (ref 136–145)
TROPONIN I SERPL-MCNC: <0.06 NG/ML (ref ?–0.06)
WBC # BLD AUTO: 4.9 THOU/UL (ref 4–11)
WBC # BLD AUTO: 5 THOU/UL (ref 4–11)
WBC # BLD AUTO: 5.3 THOU/UL (ref 4–11)
WBC # BLD AUTO: 7.5 THOU/UL (ref 4–11)

## 2021-04-19 PROCEDURE — 50101: CPT

## 2021-04-19 PROCEDURE — 62110: CPT

## 2021-04-19 PROCEDURE — 85010: CPT

## 2021-04-19 PROCEDURE — 50093: CPT

## 2021-04-19 PROCEDURE — 56760: CPT

## 2021-04-19 PROCEDURE — 50554: CPT

## 2021-04-19 PROCEDURE — 62900: CPT

## 2021-04-19 PROCEDURE — 85030: CPT

## 2021-04-19 NOTE — NUR
[facility] HERE THIS MORNING WISHING TO SPEAK WITH RUFINO MOTT, NO CHANGES OVER NIGHT
REGARDING MENTATION. OFF OF SEDATION NOW FOR 24HRS. APNEA TESTING AT 0900.

## 2021-04-19 NOTE — 2DMMODE
[facility]
[address]   [address]                   2 D/M-MODE ECHOCARDIOGRAM     
_______________________________________________________________________________
 
Name:       CAM CASTRO          Room #:         236-P       ADM [place]  
M.R.#:      [id number]                       Account #:      [id number]  
Admission:  04/14/21    Attend Phys:    Surya Zamarripa MD    
Discharge:              Date of Birth:  [birth year]  
                                                          Report #: [id number]
                                                                    [id number]
_______________________________________________________________________________
THIS REPORT FOR:  
 
cc:  FAM - Family physician unknown
     FAM - Family physician unknown
     Palomo Reynoso MD                                                   
                                                                       ~
 
--------------- APPROVED REPORT --------------
 
 
Study performed:  04/19/2021 10:56:01
 
EXAM: Comprehensive 2D, Doppler, and color-flow 
Echocardiogram 
Patient Location: ICU    
Room #:  236     Status:  routine
 
      BSA:         1.90
HR: 76 bpm BP:          108/68 mmHg 
Rhythm: NSR     
 
Other Information 
Study Quality: Good
 
Indications
[facility].
 
2D Dimensions
RVDd:  32.11 mm   
IVSd:  11.42 (7-11mm)  LVOT Diam:  20.16 (18-24mm) 
LVDd:  42.76 mm   
PWd:  9.18 (7-11mm)  Ascending Ao:  29.61 (22-36mm)
LVDs:  32.62 (25-40mm)  
Left Atrium:  34.31 (27-40mm) 
Aortic Root:  30.50 mm  
 
Volumes
Left Atrial Volume (Systole) 
Single Plane 4CH:  45.66 mL Single Plane 2CH:  47.42 mL
    LA ESV Index:  26.00 mL/m2
 
Aortic Valve
AoV Peak Jaiden.:  1.26 m/s 
AO Peak Gr.:  6.32 mmHg  LVOT Max PG:  3.32 mmHg
    LVOT Max V:  0.91 m/s
GENARO Vmax: 2.31 cm2  
 
 
[facility]
[address]
[place]  [address]
Phone:  [phone] 2 D/M-MODE ECHOCARDIOGRAM     
_______________________________________________________________________________
 
Name:            CAM CASTRO          Room #:        236-P       [facility] [place]
..#:           [id number]          Account #:     [id number]  
Admission:       04/14/21         Attend Phys:   Surya Zamarripa MD
Discharge:                  Date of Birth: [birth year]  
                         Report #:      [id number]
        [id number]
_______________________________________________________________________________
 
Mitral Valve
    E/A Ratio:  1.2
    MV Decel. Time:  263.04 ms
MV E Max Jaiden.:  0.68 m/s 
MV A Jaiden.:  0.59 m/s  
MV PHT:  76.28 ms  
IVRT:  76.12 ms   
 
Pulmonary Valve
PV Peak Jaiden.:  0.89 m/s PV Peak Gr.:  3.19 mmHg
 
Pulmonary Vein
P Vein S:    0.33 m/s P Vein A:  0.28 m/s
P Vein D:   0.45 m/s P Vein A Dur.:  107.3 msec
P Vein S/D Ratio:  0.73 
 
Tricuspid Valve
TR Peak Jaiden.:  2.56 m/s  RAP Estimate:  10.00 mmHg
TR Peak Gr.:  26.29 mmHg 
    PA Pressure:  36.00 mmHg
 
Left Ventricle
The left ventricle is normal size. There is normal left ventricular 
wall thickness. Left ventricular systolic function is normal. LVEF is 
50-55%. The left ventricular diastolic function is normal.
 
Right Ventricle
The right ventricle is normal size. The right ventricular systolic 
function is normal.
 
Atria
The left atrium size is normal. The right atrium size is 
normal.
 
Aortic Valve
The aortic valve is normal in structure. No aortic regurgitation is 
present. There is no aortic valvular stenosis.
 
Mitral Valve
The mitral valve is normal in structure. Trace mitral regurgitation. 
No evidence of mitral valve stenosis.
 
Tricuspid Valve
The tricuspid valve is normal in structure. Mild tricuspid 
regurgitation. Estimated PAP is 35 mmHg.
 
 
[facility]
[address]
[place]  [address]
Phone:  [phone]                    2 D/M-MODE ECHOCARDIOGRAM     
_______________________________________________________________________________
 
Name:            GLORIACAM FELIPE          Room #:        236-P       [facility] [place]
M.R.#:           [id number]          Account #:     [id number]  
Admission:       04/14/21         Attend Phys:   Surya Zamarripa MD
Discharge:                  Date of Birth: [birth year]  
                         Report #:      [id number]
        [id number]
_______________________________________________________________________________
 
Pulmonic Valve
The pulmonary valve is normal in structure. Trace pulmonic 
regurgitation.
 
Great Vessels
The aortic root is normal in size. The ascending aorta is normal in 
size. IVC is borderline dilated and collapses <50% with 
inspiration.
 
Pericardium
There is no pericardial effusion.
 
<Conclusion>
The left ventricle is normal size.
There is normal left ventricular wall thickness.
Left ventricular systolic function is normal.
The left ventricular diastolic function is normal.
The right ventricle is normal size.
The left atrium size is normal.
The aortic valve is normal in structure.
Trace mitral regurgitation.
Mild tricuspid regurgitation.
 
 
 
 
 
 
 
 
 
 
 
 
 
 
 
 
 
 
 
 
 
  <ELECTRONICALLY SIGNED>
   By: Palomo Reynoso MD               
  04/19/21     1153
D: 04/19/21 1153                           _____________________________________
T: 04/19/21 1153                           Palomo Reynoso MD                 /INF

## 2021-04-19 NOTE — NUR
discussed during los and am rounds. having abg labs, echo, and eeg. remains on
vent, possible going comfort care and organ donation process in place after
neuro speaks with his mom and family. family wants organ donation if able.
will cont following as needed for dc needs. pt mom not at bedside during this
visit. cm cont to wear face mask and shield during visit. bedside nurse has
been in contact with pt mom.

## 2021-04-20 LAB
ALBUMIN SERPL-MCNC: 1.8 G/DL (ref 3.4–5)
ALBUMIN SERPL-MCNC: 1.8 G/DL (ref 3.4–5)
ALBUMIN SERPL-MCNC: 1.9 G/DL (ref 3.4–5)
ALBUMIN SERPL-MCNC: 2 G/DL (ref 3.4–5)
ALT SERPL-CCNC: 29 U/L (ref 16–63)
ALT SERPL-CCNC: 32 U/L (ref 16–63)
ALT SERPL-CCNC: 34 U/L (ref 16–63)
ALT SERPL-CCNC: 37 U/L (ref 30–65)
AMYLASE SERPL-CCNC: 388 U/L (ref 25–115)
AMYLASE SERPL-CCNC: 404 U/L (ref 25–115)
AMYLASE SERPL-CCNC: 418 U/L (ref 25–115)
AMYLASE SERPL-CCNC: 431 U/L (ref 25–115)
APTT BLD: 26.7 SECONDS (ref 24.5–32.8)
APTT BLD: 27.9 SECONDS (ref 24.5–32.8)
AST SERPL-CCNC: 57 U/L (ref 15–37)
AST SERPL-CCNC: 61 U/L (ref 15–37)
AST SERPL-CCNC: 65 U/L (ref 15–37)
AST SERPL-CCNC: 71 U/L (ref 15–37)
BASOPHILS NFR BLD AUTO: 0 % (ref 0–2)
BASOPHILS NFR BLD AUTO: 0.1 % (ref 0–2)
BASOPHILS NFR BLD AUTO: 0.1 % (ref 0–2)
BASOPHILS NFR BLD AUTO: 0.2 % (ref 0–2)
BILIRUB DIRECT SERPL-MCNC: 0.1 MG/DL
BILIRUB DIRECT SERPL-MCNC: 0.2 MG/DL
BILIRUB SERPL-MCNC: 0.3 MG/DL (ref 0.2–1)
BILIRUB SERPL-MCNC: 0.4 MG/DL (ref 0.2–1)
BILIRUB SERPL-MCNC: 0.4 MG/DL (ref 0.2–1)
BILIRUB SERPL-MCNC: 0.5 MG/DL (ref 0.2–1)
BILIRUB UR-MCNC: NEGATIVE MG/DL
COLOR UR: YELLOW
EOSINOPHIL NFR BLD: 0 % (ref 0–3)
ERYTHROCYTE [DISTWIDTH] IN BLOOD BY AUTOMATED COUNT: 14.5 % (ref 10.5–14.5)
ERYTHROCYTE [DISTWIDTH] IN BLOOD BY AUTOMATED COUNT: 14.7 % (ref 10.5–14.5)
ERYTHROCYTE [DISTWIDTH] IN BLOOD BY AUTOMATED COUNT: 14.7 % (ref 10.5–14.5)
ERYTHROCYTE [DISTWIDTH] IN BLOOD BY AUTOMATED COUNT: 14.8 % (ref 10.5–14.5)
FIBRINOGEN PPP-MCNC: 849 MG/DL (ref 210–360)
FIBRINOGEN PPP-MCNC: > 860 MG/DL (ref 210–360)
GGTP: 127 U/L (ref 15–85)
GGTP: 129 U/L (ref 15–85)
GGTP: 133 U/L (ref 15–85)
GGTP: 135 U/L (ref 15–85)
GRANULOCYTES NFR BLD MANUAL: 79.4 % (ref 36–66)
GRANULOCYTES NFR BLD MANUAL: 87 % (ref 36–66)
GRANULOCYTES NFR BLD MANUAL: 93.7 % (ref 36–66)
GRANULOCYTES NFR BLD MANUAL: 96.6 % (ref 36–66)
HCT VFR BLD CALC: 35 % (ref 42–52)
HCT VFR BLD CALC: 35.2 % (ref 42–52)
HCT VFR BLD CALC: 36.1 % (ref 42–52)
HCT VFR BLD CALC: 36.9 % (ref 42–52)
HGB BLD-MCNC: 11.7 GM/DL (ref 14–18)
HGB BLD-MCNC: 12 GM/DL (ref 14–18)
HGB BLD-MCNC: 12.1 GM/DL (ref 14–18)
HGB BLD-MCNC: 12.6 GM/DL (ref 14–18)
INR PPP: 0.98
INR PPP: 0.99
INR PPP: 1.02
INR PPP: 1.04
KETONES UR STRIP-MCNC: NEGATIVE MG/DL
LIPASE: 42 U/L (ref 73–393)
LIPASE: 44 U/L (ref 73–393)
LIPASE: 46 U/L (ref 73–393)
LIPASE: 50 U/L (ref 73–393)
LYMPHOCYTES NFR BLD AUTO: 2 % (ref 24–44)
LYMPHOCYTES NFR BLD AUTO: 3.5 % (ref 24–44)
LYMPHOCYTES NFR BLD AUTO: 4.8 % (ref 24–44)
LYMPHOCYTES NFR BLD AUTO: 7.6 % (ref 24–44)
MAGNESIUM SERPL-MCNC: 2.1 MG/DL (ref 1.8–2.4)
MAGNESIUM SERPL-MCNC: 2.2 MG/DL (ref 1.8–2.4)
MAGNESIUM SERPL-MCNC: 2.4 MG/DL (ref 1.8–2.4)
MAGNESIUM SERPL-MCNC: 2.5 MG/DL (ref 1.8–2.4)
MCH RBC QN AUTO: 29.7 PG (ref 26–34)
MCH RBC QN AUTO: 30.1 PG (ref 26–34)
MCH RBC QN AUTO: 30.4 PG (ref 26–34)
MCH RBC QN AUTO: 30.7 PG (ref 26–34)
MCHC RBC AUTO-ENTMCNC: 33.2 G/DL (ref 28–37)
MCHC RBC AUTO-ENTMCNC: 33.4 G/DL (ref 28–37)
MCHC RBC AUTO-ENTMCNC: 34.2 G/DL (ref 28–37)
MCHC RBC AUTO-ENTMCNC: 34.4 G/DL (ref 28–37)
MCV RBC: 88.8 FL (ref 80–100)
MCV RBC: 89 FL (ref 80–100)
MCV RBC: 89.2 FL (ref 80–100)
MCV RBC: 90.9 FL (ref 80–100)
MONOCYTES NFR BLD: 1.4 % (ref 1–8)
MONOCYTES NFR BLD: 12.8 % (ref 1–8)
MONOCYTES NFR BLD: 2.7 % (ref 1–8)
MONOCYTES NFR BLD: 8.1 % (ref 1–8)
NEUTROPHILS # BLD: 10 THOU/UL (ref 1.4–8.2)
NEUTROPHILS # BLD: 5.5 THOU/UL (ref 1.4–8.2)
NEUTROPHILS # BLD: 6.3 THOU/UL (ref 1.4–8.2)
NEUTROPHILS # BLD: 8 THOU/UL (ref 1.4–8.2)
NITRITE UR QL STRIP: NEGATIVE
PHOSPHATE SERPL-MCNC: 2.8 MG/DL (ref 2.6–4.7)
PHOSPHATE SERPL-MCNC: 4 MG/DL (ref 2.5–4.9)
PHOSPHATE SERPL-MCNC: 4 MG/DL (ref 2.6–4.7)
PHOSPHATE SERPL-MCNC: 4.5 MG/DL (ref 2.6–4.7)
PLATELET # BLD: 167 THOU/UL (ref 150–400)
PLATELET # BLD: 176 THOU/UL (ref 150–400)
PLATELET # BLD: 180 THOU/UL (ref 150–400)
PLATELET # BLD: 191 THOU/UL (ref 150–400)
PROT SERPL-MCNC: 6.3 G/DL (ref 6.4–8.2)
PROT SERPL-MCNC: 6.6 G/DL (ref 6.4–8.2)
PROT SERPL-MCNC: 7 G/DL (ref 6.4–8.2)
PROT SERPL-MCNC: 7 G/DL (ref 6.4–8.2)
PROTHROMBIN TIME: 10.7 SECONDS (ref 9.3–11.4)
PROTHROMBIN TIME: 10.8 SECONDS (ref 9.3–11.4)
PROTHROMBIN TIME: 11.1 SECONDS (ref 9.3–11.4)
PROTHROMBIN TIME: 11.3 SECONDS (ref 9.3–11.4)
RBC # BLD AUTO: 3.93 MIL/UL (ref 4.5–6)
RBC # BLD AUTO: 3.96 MIL/UL (ref 4.5–6)
RBC # BLD AUTO: 3.97 MIL/UL (ref 4.5–6)
RBC # BLD AUTO: 4.15 MIL/UL (ref 4.5–6)
RBC # UR STRIP: (no result) /UL
SP GR UR STRIP: <= 1.005 (ref 1–1.03)
TROPONIN I SERPL-MCNC: <0.06 NG/ML (ref ?–0.06)
TROPONIN I SERPL-MCNC: <0.06 NG/ML (ref ?–0.06)
URINE CLARITY: CLEAR
URINE GLUCOSE-RANDOM*: (no result)
URINE LEUKOCYTES: NEGATIVE
URINE PROTEIN (DIPSTICK): NEGATIVE
UROBILINOGEN UR STRIP-ACNC: 0.2 E.U./DL (ref 0.2–1)
WBC # BLD AUTO: 10.3 THOU/UL (ref 4–11)
WBC # BLD AUTO: 6.9 THOU/UL (ref 4–11)
WBC # BLD AUTO: 7.2 THOU/UL (ref 4–11)
WBC # BLD AUTO: 8.5 THOU/UL (ref 4–11)

## 2021-04-20 NOTE — NUR
ASSUMED CARE AT 0700, ASSESSMENT AND VITAL SIGNS COMPLETED PER ICU PROTOCOL.
[facility] DONOR CASE, [facility] PRESENT X 2 PERSONNEL.  RN WILL CONTINUE TO MONITOR.

## 2021-04-20 NOTE — EKG
[facility]
[address]
[place]  [address]
Phone:  [phone]                    ELECTROCARDIOGRAM REPORT      
_______________________________________________________________________________
 
Name:       CAM CASTRO FELIPE XIAO         Room #:         236-[facility] 
M.RCiro#:      [id number]     Account #:      [id number]  
Admission:  21    Attend Phys:    [employer]    
Discharge:  21    Date of Birth:  [birth year]  
                                                          Report #: [id number]
   [id number]
_______________________________________________________________________________
                          [facility]
                                       
Test Date:    2021               Test Time:    21:18:58
Pat Name:     CAM GARZA           Department:   
Patient ID:   [id number]            Room:         236 P
Gender:       M                        Technician:   UNKNOWN??
:          1980               Requested By: [employer]
Order Number: [id number] MD:   Quinn Lam
                                 Measurements
Intervals                              Axis          
Rate:         87                       P:            36
RI:           126                      QRS:          37
QRSD:         72                       T:            17
QT:           340                                    
QTc:          409                                    
                           Interpretive Statements
Sinus rhythm
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
No previous ECG available for comparison
Electronically Signed On 2021 9:39:49 CDT by Quinn Lam
[email or web address]
 
 
 
 
 
 
 
 
 
 
 
 
 
 
 
 
 
 
 
 
 
 
  <ELECTRONICALLY SIGNED>
   By: Quinn Lam MD, [facility]   
  21     0939
D: 21 2118                           _____________________________________
T: 218                           Quinn Lam MD, FACC     /EPI

## 2021-04-20 NOTE — CATHLAB
[facility]
[address]
[place]   [address]                   INVASIVE PROCEDURE REPORT     
_______________________________________________________________________________
 
Name:       CAM CASTRO          Room #:         236-P       RAMÓN CASTANO#:      [id number]                       Account #:      [id number]  
Admission:  04/19/21    Attend Phys:    [facility]    
Discharge:  04/21/21    Date of Birth:  [birth year]  
                                                          Report #: [id number]
                                                                    [id number]
_______________________________________________________________________________
THIS REPORT FOR:  
 
cc:  FAM - Family physician unknown
     FAM - Family physician unknown
     Palomo Reynoso MD                                                   
                                                                       ~
 
--------------- APPROVED REPORT --------------
 
 
Study performed:  04/19/2021 15:35:37
 
Patient Details
Patient Status: In-Patient                  Room #: 236
The patient is a 80 year-old male
 
Event Personnel
Palomo Reynoso  Interventional Cardiologist, Broderick Rahman  RN, Ayla Davis RTR Monitor, Micaela Dempsey RTR, RCIS Scrub
 
Procedures Performed
Art Access - R femoral artery*  Left Heart Cath w/or w/o Coronaries 
[id number] [facility] Hemostasis w/ Mynx
 
Indication
The patient presented with an out of hospital arrest and found to 
have severe anoxic encephalopathy. Cardiac catheterization is being 
performed to assess for candidacy for organ transplant.
 
Risk Factors
Hypertension, Tobacco History ()
 
Procedure Narrative
The Right Groin^ was infiltrated with 1% Lidocaine subcutaneous 
anesthesia. A PINNACLE 6FR Sheath #[id number] sheath was inserted into 
the RFA^. Coronary angiography was performed using coronary 
diagnostic catheters. The right coronary system was accessed and 
visualized with a JR4 catheter. The left coronary system was accessed 
and visualized with a JL4 catheter. The left ventricle was accessed 
and visualized with a PIGTAIL catheter. Left ventriculogram was 
performed in 30 degree projection. Closure device was deployed with a 
Fr MYNXGRIP 6/7F #[id number]. The patient tolerated the procedure well 
and there were no complications associated with the procedure. There 
was no hematoma.
 
Intraoperative Conscious Sedation
 
 
[facility]
[address] [place]  [address]
Phone:  [phone]                    INVASIVE PROCEDURE REPORT     
_______________________________________________________________________________
 
Name:            GREGCAMLÁZAROGLORIA        Room #:        236-P       [facility] [place]
M.R.#:           [id number]          Account #:     [id number]  
Admission:       04/19/21         Attend Phys:   [employer]
Discharge:                  Date of Birth: [birth year]  
                         Report #:      [id number]
        [id number]
_______________________________________________________________________________
Sedation start time:  16:21           Case end Time:  
16:45   
 
Fluoro Time:    2.19 minutes    
Dose:     DAP 3600.00 cGycm2  399 mGy  
Contrast Type and Amount:  Omnipaque 105 ml   
 
Coronary Angiography
The patient's coronary anatomy is co- dominant. 
 
Diagnostic Cath
Left Main The left main artery is a large-caliber vessel, appears 
angiographically normal.
LAD  The LAD is a moderate-sized caliber vessel, traverses the 
anterior wall and wraps around the apex.  It terminates in the mid 
inferior wall.  This vessel appears angiographically normal.
Diagonal 1 This is a small to moderate-sized caliber vessel, with no 
flow-limiting lesions.
Diagonal 2 This is a moderate-sized caliber vessel, appears 
angiographically normal.
Circumflex The left circumflex artery is a codominant vessel, appears 
angiographically normal.
OM1  This is a small to moderate-sized caliber vessel, with no 
flow-limiting lesions.
OM2  This is a moderate-sized caliber vessel, appears 
angiographically normal.
Right Coronary The RCA is a moderate-sized caliber vessel, appears 
angiographically normal.
R PDA  This is a small to moderate-sized caliber vessel, with no 
flow-limiting lesions.  This vessel terminates in the mid inferior 
wall.
 
Left Ventriculography
The left ventricle is normal in size with normal contractility. The 
left ventricular ejection fraction is estimated to be 
50-55%.
 
Hemodynamics
The aortic pressure is 150/96 mmHg with a mean of 119 mmHg. The left 
ventricular pressure is 164/18 mmHg with a mean of mmHg. The left 
ventricular end diastolic pressure is 26 mmHg. 
 
Conclusion
1.  Angiographically normal coronary arteries.
 
 
[facility]
[address]
[place]  [address]
Phone:  [phone]                    INVASIVE PROCEDURE REPORT     
_______________________________________________________________________________
 
Name:            CAM GARZA NICKSON        Room #:        236-P       [facility] [place]
M.R.#:           [id number]          Account #:     [id number]  
Admission:       04/19/21         Attend Phys:   [employer]
Discharge:                  Date of Birth: [birth year]  
                         Report #:      [id number]
        [id number]
_______________________________________________________________________________
2.  This is a codominant system.
3.  There is normal LV systolic function.
 
 
 
 
 
 
 
 
 
 
 
 
 
 
 
 
 
 
 
 
 
 
 
 
 
 
 
 
 
 
 
 
 
 
 
 
 
 
 
 
 
 
  <ELECTRONICALLY SIGNED>
   By: Palomo Reynoso MD               
  04/20/21     1203
D: 04/20/21 1203                           _____________________________________
T: 04/20/21 1203                           Palomo Reynoso MD                 /INF